# Patient Record
Sex: MALE | Race: WHITE | Employment: UNEMPLOYED | URBAN - METROPOLITAN AREA
[De-identification: names, ages, dates, MRNs, and addresses within clinical notes are randomized per-mention and may not be internally consistent; named-entity substitution may affect disease eponyms.]

---

## 2017-01-02 ENCOUNTER — MYC MEDICAL ADVICE (OUTPATIENT)
Dept: PEDIATRICS | Facility: CLINIC | Age: 52
End: 2017-01-02

## 2017-01-02 DIAGNOSIS — G58.8 PUDENDAL NEURALGIA: Primary | ICD-10-CM

## 2017-01-03 RX ORDER — HYDROCODONE BITARTRATE AND ACETAMINOPHEN 10; 325 MG/1; MG/1
.5-1 TABLET ORAL EVERY 8 HOURS PRN
Qty: 60 TABLET | Refills: 0 | Status: SHIPPED | OUTPATIENT
Start: 2017-01-03 | End: 2017-02-20

## 2017-01-03 NOTE — TELEPHONE ENCOUNTER
Norco       Last Written Prescription Date:  11/1/16  Last Fill Quantity: 60,   # refills: 0  Last Office Visit with [facility], [facility] or [facility] prescribing provider: 11/29/16  Future Office visit:       Routing refill request to provider for review/approval because:  Drug not on the [facility], [facility] or [facility] refill protocol or controlled substance    Zamzam Zuleta RN

## 2017-01-25 ENCOUNTER — MYC MEDICAL ADVICE (OUTPATIENT)
Dept: PEDIATRICS | Facility: CLINIC | Age: 52
End: 2017-01-25

## 2017-01-25 DIAGNOSIS — G58.8 PUDENDAL NEURALGIA: ICD-10-CM

## 2017-01-25 DIAGNOSIS — G89.4 CHRONIC PAIN SYNDROME: Primary | ICD-10-CM

## 2017-01-25 RX ORDER — HYDROMORPHONE HYDROCHLORIDE 2 MG/1
2 TABLET ORAL EVERY 4 HOURS PRN
Qty: 60 TABLET | Refills: 0 | Status: SHIPPED | OUTPATIENT
Start: 2017-01-25 | End: 2017-03-15

## 2017-01-25 NOTE — TELEPHONE ENCOUNTER
Dilaudid 2 mg      Last Written Prescription Date:  11/29/16  Last Fill Quantity: 60,   # refills: 0  Last Office Visit with [facility] or [facility] prescribing provider: 11/29/16  Future Office visit:       Routing refill request to provider for review/approval because:  Drug not on the [facility], [facility] or [facility] refill protocol or controlled substance    Marvin, RN  Triage Nurse

## 2017-01-25 NOTE — TELEPHONE ENCOUNTER
Walked RX to [facility]. Called and left detailed message informing pt of rx refilled and sent to our pharmacy and pharmacy will call when ready to be picked up and to call if he has any questions or concerns    Amber Johnson MA 1/25/2017 1:00 PM

## 2017-01-30 ENCOUNTER — CARE COORDINATION (OUTPATIENT)
Dept: CARE COORDINATION | Facility: CLINIC | Age: 52
End: 2017-01-30

## 2017-01-30 NOTE — Clinical Note
Health Care Home - Access Care Plan    About Me  Patient Name:  Taco Rooney    YOB: 1965  Age:                            51 year old   Yousif MRN:         [id number] Telephone Information:     Home Phone [phone]   Mobile [phone]       Address:    [address]  [place] [address] Email address:  [email or web address]      Emergency Contact(s)  Name Relationship Lgl Grd Work Phone Home Phone Mobile Phone   1. BHAVINK, ASSE Relative  [phone]   2. NO SECONDARY C*                  Health Maintenance: Routine Health maintenance Reviewed: Not assessed    My Access Plan  Medical Emergency 911   Questions or concerns during clinic hours Primary Clinic Line, I will call the clinic directly: Primary Clinic[facility] [place] [phone]   24 Hour Appointment Line [phone] or  [phone])  (toll free)   24 Hour Nurse Line [phone] (toll free)   Questions or concerns outside clinic hours 24 Hour Appointment Line, I will call the after-hours on-call line:   [facility] [phone] or [phone]) (toll-free)   Preferred Urgent Care Preferred Urgent Care: [facility] [place], [phone]   Preferred Hospital Preferred Hospital: [facility]  [phone]   Preferred Pharmacy [facility] [place]  [place]  [address]      Behavioral Health Crisis Line [facility], [phone] or 911     My Care Team Members  Patient Care Team       Relationship Specialty Notifications Start End    Eriberto Strange MD PCP - General   6/27/03     Phone: [phone] Fax: [phone]         [address] [place] [address]    Masters, Jeanne BLEDSOE, RN Clinic Care Coordinator   1/30/17     Comment:  Ph:  [phone]        My Medical and Care Information  Problem List   Patient Active Problem List   Diagnosis     Pudendal neuralgia      Generalized anxiety disorder     Hyperlipidemia with target LDL less than 130     Palpitations     Hypertriglyceridemia     HDL lipoprotein deficiency     Vitamin D deficiency     Chronic pain syndrome      Current Medications and Allergies:  See printed Medication Report

## 2017-01-30 NOTE — PROGRESS NOTES
"Clinic Care Coordination Contact  OUTREACH    Referral Information:  Referral Source: Self-patient/Caregiver  Reason for Contact:    Patient outreached to CCRN today.    He referenced a NoWait message that he sent to clinic on 08/12/2016.    See encounter for details.   Patient is experiencing care-giver stress for his grandmother, who is 99 y/o and a patient of [facility].   Care Conference: No     Universal Utilization:   ED Visits in last year: 0  Hospital visits in last year: 0  Last PCP appointment: 11/29/16  Missed Appointments:  (N/A)  Concerns: Caregiver stress.   Multiple Providers or Specialists:  (N/A)    Clinical Concerns:  Current Medical Concerns:  N/A  Current Behavioral Concerns:   Patient reports that he is suffering from caregiver stress of his 99 y/o grandmother ([facility] Patient).    He states that he has had \"better days\" than today.   He lives in [place], [place] and grandmother resides in [place], [place].   He reports that she has \"rough days.\"  She is \"feisty and stubborn.   Things are really stressful and dicey right now.   I don't have much help from my other relatives, in fact it is the complete opposite of that.\"       He states that his uncle lives in [place], [place] and is not helpful at all, despite what he thinks is going on with the patient's grandmother.      Education Provided to patient: CCRN advised that I could provide resources for him today, as he is primary caregiver.   I advised that he would have to make contact with her PCP office through [facility] for further assistance regarding her care - directly.     Clinical Pathway Name: None    Medication Management:  No concerns identified today.     Functional Status:  Mobility Status:  (Not assessed. )  Equipment Currently Used at Home:  (N/A)           Psychosocial:  Current living arrangement:: Not Assessed  Financial/Insurance: [employer] Freedom Plan,  Medicare- Part B Supplement        Resources and Interventions:  Current " Resources:  (N/A);  (N/A)  PAS Number:  (N/A)  Senior Linkage Line Referral Placed:  (Information given today for caregivers. )  Advanced Care Plans/Directives on file:: No  Referrals Placed: Senior Linkage Line - phone number and website provided to patient today.     Frequency of Care Coordination: Q 2-4 weeks  Upcoming appointment:  (None)        Plan:  [facility] Care Plan mailed to home address on file.    CCRN will outreach to patient in 2-4 weeks and will remain available to patient in that timeframe should he need anything.       Patient/Caregiver understanding: Patient agreed with plan.     Jeanne Skaggs, KJ  [facility]  Clinic Care Coordinator - [place] and [place] Locations   Direct:  [phone] (voicemail available)

## 2017-02-19 ENCOUNTER — MYC MEDICAL ADVICE (OUTPATIENT)
Dept: PEDIATRICS | Facility: CLINIC | Age: 52
End: 2017-02-19

## 2017-02-19 DIAGNOSIS — G89.4 CHRONIC PAIN SYNDROME: ICD-10-CM

## 2017-02-19 DIAGNOSIS — G58.8 PUDENDAL NEURALGIA: ICD-10-CM

## 2017-02-20 RX ORDER — HYDROCODONE BITARTRATE AND ACETAMINOPHEN 10; 325 MG/1; MG/1
.5-1 TABLET ORAL EVERY 8 HOURS PRN
Qty: 60 TABLET | Refills: 0 | Status: SHIPPED | OUTPATIENT
Start: 2017-02-20 | End: 2017-04-05

## 2017-02-20 NOTE — TELEPHONE ENCOUNTER
Hydrocodone      Last Written Prescription Date:  1/3/17  Last Fill Quantity: 60,   # refills: 0  Last Office Visit with [facility] prescribing provider: 11/29/16  Future Office visit:       Signed controlled substance agreement on 11/29/16    Routing refill request to provider for review/approval because:  Drug not on the [facility] or [facility] refill protocol or controlled substance.      No [place]  access at this time    Ivana Bledsoe, RN  Triage Nurse

## 2017-03-13 ENCOUNTER — CARE COORDINATION (OUTPATIENT)
Dept: CARE COORDINATION | Facility: CLINIC | Age: 52
End: 2017-03-13

## 2017-03-13 NOTE — LETTER
[facility] CARE COORDINATION  [address] [facility] [address]  Phone: [phone]      March 20, 2017      Taco Rooney  [address]    [facility] [address]    Dear Taco,  I am the Clinic Care Coordinator that works with your primary care provider's clinic. I wanted to introduce myself and provide you with my contact information for you to be able to call me with any questions or concerns. Below is a description of what Clinic Care Coordination is and how I can further assist you.     The Clinic Care Coordinator role is a Registered Nurse and/or  who understands the health care system. The goal of Clinic Care Coordination is to help you manage your health and improve access to the [facility] system in the most efficient manner.  The Registered Nurse can assist you in meeting your health care goals by providing education, coordinating services, and strengthening the communication among your providers. The  can assist you with financial, behavioral, psychosocial, and chemical dependency and counseling/psychiatric resources.    Please feel free to keep this letter and contact information to contact me at [phone] with any further questions or concerns that may arise. We at [facility] are focused on providing you with the highest-quality healthcare experience possible and that all starts with you.       Sincerely,     Jeanne Pastranas    Enclosed: I have enclosed a copy of a 24 Hour Access Plan. This has helpful phone numbers for you to call when needed. Please keep this in an easy to access place to use as needed.      24 Hour Access Plan    Presenting Problem Signs and Symptoms Treatment Plan    Questions or concerns during clinic hours    I will call the clinic directly     Questions or concerns outside clinic hours    I will call the 24 hour nurse line at [phone]    Patient needs to schedule an appointment    I will call the 24 hour scheduling team at  [phone] or clinic directly    Same day treatment     I will call the clinic first, nurse line if after hours, urgent care and express care if needed                          Clinic Care Coordinator: Jeanne Skaggs, RN Care Coordinator [phone]

## 2017-03-13 NOTE — PROGRESS NOTES
Clinic Care Coordination Contact  [facility]/Voicemail    Referral Source: Self-patient/Caregiver  Clinical Data: Care Coordinator Outreach  Outreach attempted x 1.  Left message on voicemail with call back information and requested return call.  Plan:  Care Coordinator will try to reach patient again in 3-5 business days.    Jeanne Skaggs, RN  [facility] Care Coordinator - [place] and [place] Locations   Direct:  [phone] (voicemail available)

## 2017-03-14 ENCOUNTER — MYC MEDICAL ADVICE (OUTPATIENT)
Dept: PEDIATRICS | Facility: CLINIC | Age: 52
End: 2017-03-14

## 2017-03-14 DIAGNOSIS — G89.4 CHRONIC PAIN SYNDROME: ICD-10-CM

## 2017-03-14 DIAGNOSIS — G58.8 PUDENDAL NEURALGIA: ICD-10-CM

## 2017-03-14 DIAGNOSIS — F51.01 PRIMARY INSOMNIA: ICD-10-CM

## 2017-03-15 RX ORDER — HYDROMORPHONE HYDROCHLORIDE 2 MG/1
2 TABLET ORAL EVERY 4 HOURS PRN
Qty: 60 TABLET | Refills: 0 | Status: SHIPPED | OUTPATIENT
Start: 2017-03-15 | End: 2017-04-25

## 2017-03-15 NOTE — TELEPHONE ENCOUNTER
Pt should've enough ambien to last till 04/14. One month early refill. Checked with [facility] pharmacy, last refill dates of Ambien(30# each) were 10/14, 11/11, 11/30, 12/29, 01/26 & 02/22.     Ambien 10 mg       Last Written Prescription Date:  10/14/16  Last Fill Quantity: 30,   # refills: 5  Dilaudid 2 mg      Last Written Prescription Date:  01/25/17  Last Fill Quantity: 60,   # refills: 0  Last Office Visit with [facility] or  [facility] prescribing provider: 11/29/16  Future Office visit:       Routing refill request to provider for review/approval because:  Drug not on the [facility] or [facility] refill protocol or controlled substance    Marvin, RN  Triage Nurse

## 2017-03-17 RX ORDER — ZOLPIDEM TARTRATE 10 MG/1
10 TABLET ORAL
Qty: 30 TABLET | Refills: 5 | Status: SHIPPED | OUTPATIENT
Start: 2017-03-17 | End: 2017-08-28

## 2017-03-20 NOTE — PROGRESS NOTES
Clinic Care Coordination Contact  [facility]/Voicemail    Referral Source: Self-patient/Caregiver  Clinical Data: Care Coordinator Outreach  Outreach attempted x 2.    Plan: Care Coordinator will mail out care coordination introduction letter with care coordinator contact information and explanation of care coordination services. Care Coordinator will do no further outreaches at this time.  Placing on INACTIVE status.    CCRN is not actively following this patient.    Jeanne Skaggs, RN  [facility] Care Coordinator - [place] and [place] Locations   Direct:  [phone] (voicemail available)

## 2017-04-05 ENCOUNTER — MYC MEDICAL ADVICE (OUTPATIENT)
Dept: PEDIATRICS | Facility: CLINIC | Age: 52
End: 2017-04-05

## 2017-04-05 DIAGNOSIS — G58.8 PUDENDAL NEURALGIA: ICD-10-CM

## 2017-04-05 RX ORDER — HYDROCODONE BITARTRATE AND ACETAMINOPHEN 10; 325 MG/1; MG/1
.5-1 TABLET ORAL EVERY 8 HOURS PRN
Qty: 60 TABLET | Refills: 0 | Status: SHIPPED | OUTPATIENT
Start: 2017-04-05 | End: 2017-05-11

## 2017-04-05 NOTE — TELEPHONE ENCOUNTER
Please drop off rx with [facility] pharmacy when ready. Thanks.     Controlled Substance Refill Request for Norco(02/20/17 60#)  Problem List Complete:  Yes  Medication(s): Hydromorphone 2 mg, Vicodin 5/325.   Maximum quantity per month: 60, 30  Clinic visit frequency required: Q 3 months    checked in past 6 months?  Yes (04/05/17) refilled 60# on 02/20/17, 01/03/17 & 11/01/16     Marvin, RN  Triage Nurse

## 2017-04-24 ENCOUNTER — MYC MEDICAL ADVICE (OUTPATIENT)
Dept: PEDIATRICS | Facility: CLINIC | Age: 52
End: 2017-04-24

## 2017-04-24 DIAGNOSIS — G89.4 CHRONIC PAIN SYNDROME: ICD-10-CM

## 2017-04-24 DIAGNOSIS — G58.8 PUDENDAL NEURALGIA: ICD-10-CM

## 2017-04-25 RX ORDER — HYDROMORPHONE HYDROCHLORIDE 2 MG/1
2 TABLET ORAL EVERY 4 HOURS PRN
Qty: 60 TABLET | Refills: 0 | Status: SHIPPED | OUTPATIENT
Start: 2017-04-25 | End: 2017-05-12 | Stop reason: SINTOL

## 2017-04-25 NOTE — TELEPHONE ENCOUNTER
Dilaudid 2 MG      Last Written Prescription Date:  3/15/17  Last Fill Quantity: 60,   # refills: 0  Last Office Visit with [facility] or  [facility] prescribing provider: 11/29/16  Future Office visit:      checked LF 3/15/17  Routing refill request to provider for review/approval because:  Drug not on the [facility], P or  Health refill protocol or controlled substance    Zamzam Zuleta RN

## 2017-05-11 ENCOUNTER — MYC MEDICAL ADVICE (OUTPATIENT)
Dept: PEDIATRICS | Facility: CLINIC | Age: 52
End: 2017-05-11

## 2017-05-11 DIAGNOSIS — G58.8 PUDENDAL NEURALGIA: ICD-10-CM

## 2017-05-11 DIAGNOSIS — E78.5 HYPERLIPIDEMIA WITH TARGET LDL LESS THAN 130: ICD-10-CM

## 2017-05-11 NOTE — TELEPHONE ENCOUNTER
simvastatin       Last Written Prescription Date: 2-4-16  Last Fill Quantity: 90, # refills: 3    Last [facility] Visit with [facility] or [facility] prescribing provider:  11-29-16   Future Office Visit:      Cholesterol   Date Value Ref Range Status   01/14/2016 139 <200 mg/dL Final     HDL Cholesterol   Date Value Ref Range Status   01/14/2016 39 (L) >39 mg/dL Final     LDL Cholesterol Calculated   Date Value Ref Range Status   01/14/2016 69 <100 mg/dL Final     Comment:     Desirable:       <100 mg/dl     Triglycerides   Date Value Ref Range Status   01/14/2016 157 (H) <150 mg/dL Final     Comment:     Borderline high:  150-199 mg/dl   High:             200-499 mg/dl   Very high:       >499 mg/dl       Cholesterol/HDL Ratio   Date Value Ref Range Status   10/29/2014 3.1 0.0 - 5.0 Final     ALT   Date Value Ref Range Status   01/14/2016 65 0 - 70 U/L Final   norco      Last Written Prescription Date:  4-5-17  Last Fill Quantity: 60,   # refills: 0  Last Office Visit with [facility] or [facility] prescribing provider: 11-29-16  Future Office visit:       Routing refill request to provider for review/approval because:  Drug not on the [facility], [facility] or [facility] refill protocol or controlled substance  Medication(s): Hydromorphone 2 mg, Vicodin 5/325.   Maximum quantity per month: 60, 30  Clinic visit frequency required: Q 3 months   Controlled substance agreement:      Encounter-Level CSA:      There are no encounter-level csa.       Pain Clinic evaluation in the past: Yes  Date/Location: several different clinics  Last [facility] website verification: done on 7/29/16

## 2017-05-12 RX ORDER — SIMVASTATIN 20 MG
20 TABLET ORAL AT BEDTIME
Qty: 90 TABLET | Refills: 0 | Status: SHIPPED | OUTPATIENT
Start: 2017-05-12 | End: 2017-08-28

## 2017-05-12 RX ORDER — HYDROCODONE BITARTRATE AND ACETAMINOPHEN 10; 325 MG/1; MG/1
.5-1 TABLET ORAL EVERY 8 HOURS PRN
Qty: 60 TABLET | Refills: 0 | Status: SHIPPED | OUTPATIENT
Start: 2017-05-12 | End: 2017-06-28

## 2017-05-30 ENCOUNTER — HOSPITAL ENCOUNTER (EMERGENCY)
Facility: CLINIC | Age: 52
Discharge: HOME | End: 2017-05-31
Attending: EMERGENCY MEDICINE | Admitting: EMERGENCY MEDICINE
Payer: MEDICARE

## 2017-05-30 ENCOUNTER — TELEPHONE (OUTPATIENT)
Dept: NURSING | Facility: CLINIC | Age: 52
End: 2017-05-30

## 2017-05-30 ENCOUNTER — APPOINTMENT (OUTPATIENT)
Dept: CT IMAGING | Facility: CLINIC | Age: 52
End: 2017-05-30
Attending: EMERGENCY MEDICINE
Payer: MEDICARE

## 2017-05-30 DIAGNOSIS — M54.9 ACUTE BILATERAL BACK PAIN, UNSPECIFIED BACK LOCATION: ICD-10-CM

## 2017-05-30 DIAGNOSIS — K80.20 CALCULUS OF GALLBLADDER WITHOUT CHOLECYSTITIS WITHOUT OBSTRUCTION: ICD-10-CM

## 2017-05-30 LAB
ALBUMIN SERPL-MCNC: 4.5 G/DL (ref 3.4–5)
ALP SERPL-CCNC: 96 U/L (ref 40–150)
ALT SERPL W P-5'-P-CCNC: 48 U/L (ref 0–70)
ANION GAP SERPL CALCULATED.3IONS-SCNC: 9 MMOL/L (ref 3–14)
AST SERPL W P-5'-P-CCNC: 43 U/L (ref 0–45)
BASOPHILS # BLD AUTO: 0 10E9/L (ref 0–0.2)
BASOPHILS NFR BLD AUTO: 0.2 %
BILIRUB SERPL-MCNC: 1.4 MG/DL (ref 0.2–1.3)
BUN SERPL-MCNC: 12 MG/DL (ref 7–30)
CALCIUM SERPL-MCNC: 9.5 MG/DL (ref 8.5–10.1)
CHLORIDE SERPL-SCNC: 103 MMOL/L (ref 94–109)
CO2 SERPL-SCNC: 26 MMOL/L (ref 20–32)
CREAT BLD-MCNC: 1.2 MG/DL (ref 0.66–1.25)
CREAT SERPL-MCNC: 1.05 MG/DL (ref 0.66–1.25)
DIFFERENTIAL METHOD BLD: ABNORMAL
EOSINOPHIL # BLD AUTO: 0 10E9/L (ref 0–0.7)
EOSINOPHIL NFR BLD AUTO: 0.5 %
ERYTHROCYTE [DISTWIDTH] IN BLOOD BY AUTOMATED COUNT: 12.6 % (ref 10–15)
GFR SERPL CREATININE-BSD FRML MDRD: 63 ML/MIN/1.7M2
GFR SERPL CREATININE-BSD FRML MDRD: 74 ML/MIN/1.7M2
GLUCOSE SERPL-MCNC: 111 MG/DL (ref 70–99)
HCT VFR BLD AUTO: 42.4 % (ref 40–53)
HGB BLD-MCNC: 15.4 G/DL (ref 13.3–17.7)
IMM GRANULOCYTES # BLD: 0 10E9/L (ref 0–0.4)
IMM GRANULOCYTES NFR BLD: 0.2 %
LIPASE SERPL-CCNC: 165 U/L (ref 73–393)
LYMPHOCYTES # BLD AUTO: 0.8 10E9/L (ref 0.8–5.3)
LYMPHOCYTES NFR BLD AUTO: 14.8 %
MCH RBC QN AUTO: 32.6 PG (ref 26.5–33)
MCHC RBC AUTO-ENTMCNC: 36.3 G/DL (ref 31.5–36.5)
MCV RBC AUTO: 90 FL (ref 78–100)
MONOCYTES # BLD AUTO: 0.4 10E9/L (ref 0–1.3)
MONOCYTES NFR BLD AUTO: 7.8 %
NEUTROPHILS # BLD AUTO: 4.2 10E9/L (ref 1.6–8.3)
NEUTROPHILS NFR BLD AUTO: 76.5 %
NRBC # BLD AUTO: 0 10*3/UL
NRBC BLD AUTO-RTO: 0 /100
PLATELET # BLD AUTO: 109 10E9/L (ref 150–450)
POTASSIUM SERPL-SCNC: 3.7 MMOL/L (ref 3.4–5.3)
PROT SERPL-MCNC: 7.3 G/DL (ref 6.8–8.8)
RBC # BLD AUTO: 4.72 10E12/L (ref 4.4–5.9)
SODIUM SERPL-SCNC: 138 MMOL/L (ref 133–144)
WBC # BLD AUTO: 5.5 10E9/L (ref 4–11)

## 2017-05-30 PROCEDURE — 83690 ASSAY OF LIPASE: CPT | Performed by: EMERGENCY MEDICINE

## 2017-05-30 PROCEDURE — 74177 CT ABD & PELVIS W/CONTRAST: CPT

## 2017-05-30 PROCEDURE — 25000128 H RX IP 250 OP 636: Performed by: EMERGENCY MEDICINE

## 2017-05-30 PROCEDURE — 85025 COMPLETE CBC W/AUTO DIFF WBC: CPT | Performed by: EMERGENCY MEDICINE

## 2017-05-30 PROCEDURE — 99285 EMERGENCY DEPT VISIT HI MDM: CPT | Mod: 25

## 2017-05-30 PROCEDURE — 71260 CT THORAX DX C+: CPT

## 2017-05-30 PROCEDURE — 82565 ASSAY OF CREATININE: CPT | Mod: 91

## 2017-05-30 PROCEDURE — 80053 COMPREHEN METABOLIC PANEL: CPT | Performed by: EMERGENCY MEDICINE

## 2017-05-30 RX ORDER — IOPAMIDOL 755 MG/ML
500 INJECTION, SOLUTION INTRAVASCULAR ONCE
Status: COMPLETED | OUTPATIENT
Start: 2017-05-30 | End: 2017-05-30

## 2017-05-30 RX ADMIN — SODIUM CHLORIDE 59 ML: 9 INJECTION, SOLUTION INTRAVENOUS at 23:16

## 2017-05-30 RX ADMIN — IOPAMIDOL 75 ML: 755 INJECTION, SOLUTION INTRAVENOUS at 23:16

## 2017-05-30 ASSESSMENT — ENCOUNTER SYMPTOMS
SHORTNESS OF BREATH: 1
BACK PAIN: 1
HEADACHES: 0
ABDOMINAL PAIN: 0

## 2017-05-30 NOTE — ED AVS SNAPSHOT
[facility] Emergency Department    [address]    [facility] [address]    Phone:  [phone]    Fax:  [phone]                                       Taco Rooney   MRN: [id number]    Department:  [facility] Emergency Department   Date of Visit:  5/30/2017           Patient Information     Date Of Birth          1965        Your diagnoses for this visit were:     Acute bilateral back pain, unspecified back location     Calculus of gallbladder without cholecystitis without obstruction        You were seen by Alfredo Davila MD.        Discharge Instructions       Please make an appointment to follow up with your primary care provider in 4-5 days if not improving.    Return to ER immediately if you develop: worsening pain, new numbness or weakness in legs, loss of bowel or bladder control, inability to urinate, Fever > 101, persistent nausea or vomiting OR you have any other concerns about your health.        Discharge Instructions  Back Pain  You were seen today for back pain. Back pain can have many causes, but most will get better without surgery or other specific treatment. Sometimes there is a herniated ( slipped ) disc. We don t usually do MRI scans to look for these right away, since most herniated discs will get better on their own with time.  Today, we did not find any evidence that your back pain was caused by a serious condition, such as an infection, fracture, or tumor. However, sometimes symptoms develop over time and cannot be found during an emergency visit, so it is very important that you follow up with your primary doctor.  Return to the Emergency Department if:    You develop a fever with your back pain.     You have weakness or change in sensation in one or both legs.    You lose control of your bowels or bladder, or can t empty your bladder.    Your pain gets much worse.     Follow-up with your doctor:    Unless your pain has completely  gone away, please make an appointment with your doctor within one week.  You may need further management of your back pain, such as more pain medication, imaging such as an X-ray or MRI, or physical therapy.    What can I do to help myself?    Remain Active -- People are often afraid that they will hurt their back further or delay recovery by remaining active, but this is one of the best things you can do for your back. In fact, prolonged bed rest is not recommended. Studies have shown that people with low back pain recover faster when they remain active. Movement helps to bring blood flow to the muscles and relieve muscle spasms as well as preventing loss of muscle strength.    Heat -- Using a heating pad can help with low back pain during the first few weeks. Do not sleep with a heating pad, as you can be burned.     Pain medications - You may take a pain medication such as Tylenol  (acetaminophen), Advil , Nuprin  (ibuprofen) or Aleve  (naproxen).  If you have been given a narcotic such as Vicodin  (hydrocodone with acetaminophen), Percocet  (oxycodone with acetaminophen), codeine, or a muscle relaxant such as Flexeril  (cyclobenzaprine) or Soma  (carisoprodol), do not drive for four hours after you have taken it. If the narcotic contains Tylenol  (acetaminophen), do not take Tylenol  with it. All narcotics will cause constipation, so eat a high fiber diet.   If you were given a prescription for medicine here today, be sure to read all of the information (including the package insert) that comes with your prescription.  This will include important information about the medicine, its side effects, and any warnings that you need to know about.  The pharmacist who fills the prescription can provide more information and answer questions you may have about the medicine.  If you have questions or concerns that the pharmacist cannot address, please call or return to the Emergency Department.   Opioid Medication  Information    Pain medications are among the most commonly prescribed medicines, so we are including this information for all our patients. If you did not receive pain medication or get a prescription for pain medicine, you can ignore it.     You may have been given a prescription for an opioid (narcotic) pain medicine and/or have received a pain medicine while here in the Emergency Department. These medicines can make you drowsy or impaired. You must not drive, operate dangerous equipment, or engage in any other dangerous activities while taking these medications. If you drive while taking these medications, you could be arrested for DUI, or driving under the influence. Do not drink any alcohol while you are taking these medications.     Opioid pain medications can cause addiction. If you have a history of chemical dependency of any type, you are at a higher risk of becoming addicted to pain medications.  Only take these prescribed medications to treat your pain when all other options have been tried. Take it for as short a time and as few doses as possible. Store your pain pills in a secure place, as they are frequently stolen and provide a dangerous opportunity for children or visitors in your house to start abusing these powerful medications. We will not replace any lost or stolen medicine.  As soon as your pain is better, you should flush all your remaining medication.     Many prescription pain medications contain Tylenol  (acetaminophen), including Vicodin , Tylenol #3 , Norco , Lortab , and Percocet .  You should not take any extra pills of Tylenol  if you are using these prescription medications or you can get very sick.  Do not ever take more than 3000 mg of acetaminophen in any 24 hour period.    All opioids tend to cause constipation. Drink plenty of water and eat foods that have a lot of fiber, such as fruits, vegetables, prune juice, apple juice and high fiber cereal.  Take a laxative if you don t  move your bowels at least every other day. Miralax , Milk of Magnesia, Colace , or Senna  can be used to keep you regular.      Remember that you can always come back to the Emergency Department if you are not able to see your regular doctor in the amount of time listed above, if you get any new symptoms, or if there is anything that worries you.            What are Gallstones?  The gallbladder stores bile, a fluid made by the liver. Bile helps digest fats in the foods you eat. Gallstones form when certain substances in the bile crystallize and become solid. In some cases, the stones don t cause any symptoms. In others, they irritate the walls of the gallbladder. More serious problems can occur if stones move into nearby ducts--such as the common bile duct--and cause blockages. This can block the flow of bile and can lead to pain, nausea, and infection.    Common symptoms  Gallbladder problems can cause painful attacks, often after a meal. Some people have only one attack. Others have many. Common symptoms include:    Severe, steady pain or aching in the upper right abdomen, back, or right shoulder blade, lasting from 30 minutes to several hours    A dull ache beneath the ribs or breastbone    Nausea, upset stomach, or vomiting    Jaundice (a buildup of bile chemicals in the blood), which causes yellowing of the skin and eyes, dark urine, and itching. Call your doctor immediately if this system develops.  Treating gallstones  If your stones are not causing symptoms, you may choose to delay treatment. But if you ve had one or more painful attacks, your doctor will likely recommend removing your gallbladder. This prevents more stones from forming and causing attacks. It also helps prevent complications, such as stones passing into the ducts and causing infection or pancreatitis. After the gallbladder is removed, your liver will still make bile to aid digestion.     If you re pregnant  Hormone changes during pregnancy  can make bile more likely to form stones. If your gallbladder needs to be removed, your doctor will talk with you about the timing for surgery. In some cases, it can be delayed until after childbirth. In others, you may have surgery during pregnancy. This helps protect you and your baby s health.        4803-7604 [employer]. [address] [place] [address]. All rights reserved. This information is not intended as a substitute for professional medical care. Always follow your healthcare professional's instructions.            24 Hour Appointment Hotline       To make an appointment at any [facility], call [phone] ([phone]). If you don't have a family doctor or clinic, we will help you find one. [facility] are conveniently located to serve the needs of you and your family.             Review of your medicines      START taking        Dose / Directions Last dose taken    cyclobenzaprine 10 MG tablet   Commonly known as:  FLEXERIL   Dose:  10 mg   Quantity:  30 tablet        Take 1 tablet (10 mg) by mouth 3 times daily as needed for muscle spasms   Refills:  0        oxyCODONE 5 MG IR tablet   Commonly known as:  ROXICODONE   Dose:  5-10 mg   Quantity:  12 tablet        Take 1-2 tablets (5-10 mg) by mouth every 6 hours as needed for moderate to severe pain   Refills:  0          Our records show that you are taking the medicines listed below. If these are incorrect, please call your family doctor or clinic.        Dose / Directions Last dose taken    aspirin 81 MG tablet   Dose:  81 mg   Quantity:  100 tablet        Take 1 tablet (81 mg) by mouth daily   Refills:  3        diazepam 10 MG Gel rectal kit   Commonly known as:  DIASTAT ACUDIAL   Dose:  10 mg   Quantity:  60 each        Place 10 mg rectally 2 times daily as needed   Refills:  5        fish oil-omega-3 fatty acids 1000 MG capsule        1 twice per day   Refills:  0        HYDROcodone-acetaminophen  MG  per tablet   Commonly known as:  NORCO   Dose:  0.5-1 tablet   Quantity:  60 tablet        Take 0.5-1 tablets by mouth every 8 hours as needed for pain   Refills:  0        simvastatin 20 MG tablet   Commonly known as:  ZOCOR   Dose:  20 mg   Quantity:  90 tablet        Take 1 tablet (20 mg) by mouth At Bedtime   Refills:  0        zolpidem 10 MG tablet   Commonly known as:  AMBIEN   Dose:  10 mg   Quantity:  30 tablet        Take 1 tablet (10 mg) by mouth nightly as needed for sleep   Refills:  5                Prescriptions were sent or printed at these locations (2 Prescriptions)                   Other Prescriptions                Printed at Department/Unit printer (2 of 2)         oxyCODONE (ROXICODONE) 5 MG IR tablet               cyclobenzaprine (FLEXERIL) 10 MG tablet                Procedures and tests performed during your visit     CBC with platelets differential    CT Chest/Abdomen/Pelvis w Contrast    Comprehensive metabolic panel    Creatinine POCT    ISTAT creatinine nursing POCT    Lipase      Orders Needing Specimen Collection     None      Pending Results     Date and Time Order Name Status Description    5/30/2017 2221 CT Chest/Abdomen/Pelvis w Contrast Preliminary             Pending Culture Results     No orders found for last 3 day(s).            Pending Results Instructions     If you had any lab results that were not finalized at the time of your Discharge, you can call the ED Lab Result RN at [phone]. You will be contacted by this team for any positive Lab results or changes in treatment. The nurses are available 7 days a week from 10A to 6:30P.  You can leave a message 24 hours per day and they will return your call.        Test Results From Your Hospital Stay        5/30/2017 10:29 PM      Component Results     Component Value Ref Range & Units Status    WBC 5.5 4.0 - 11.0 10e9/L Final    RBC Count 4.72 4.4 - 5.9 10e12/L Final    Hemoglobin 15.4 13.3 - 17.7 g/dL Final    Hematocrit  42.4 40.0 - 53.0 % Final    MCV 90 78 - 100 fl Final    MCH 32.6 26.5 - 33.0 pg Final    MCHC 36.3 31.5 - 36.5 g/dL Final    RDW 12.6 10.0 - 15.0 % Final    Platelet Count 109 (L) 150 - 450 10e9/L Final    Diff Method Automated Method  Final    % Neutrophils 76.5 % Final    % Lymphocytes 14.8 % Final    % Monocytes 7.8 % Final    % Eosinophils 0.5 % Final    % Basophils 0.2 % Final    % Immature Granulocytes 0.2 % Final    Nucleated RBCs 0 0 /100 Final    Absolute Neutrophil 4.2 1.6 - 8.3 10e9/L Final    Absolute Lymphocytes 0.8 0.8 - 5.3 10e9/L Final    Absolute Monocytes 0.4 0.0 - 1.3 10e9/L Final    Absolute Eosinophils 0.0 0.0 - 0.7 10e9/L Final    Absolute Basophils 0.0 0.0 - 0.2 10e9/L Final    Abs Immature Granulocytes 0.0 0 - 0.4 10e9/L Final    Absolute Nucleated RBC 0.0  Final         5/30/2017 10:49 PM      Component Results     Component Value Ref Range & Units Status    Sodium 138 133 - 144 mmol/L Final    Potassium 3.7 3.4 - 5.3 mmol/L Final    Chloride 103 94 - 109 mmol/L Final    Carbon Dioxide 26 20 - 32 mmol/L Final    Anion Gap 9 3 - 14 mmol/L Final    Glucose 111 (H) 70 - 99 mg/dL Final    Urea Nitrogen 12 7 - 30 mg/dL Final    Creatinine 1.05 0.66 - 1.25 mg/dL Final    GFR Estimate 74 >60 mL/min/1.7m2 Final    Non  GFR Calc    GFR Estimate If Black 89 >60 mL/min/1.7m2 Final    African American GFR Calc    Calcium 9.5 8.5 - 10.1 mg/dL Final    Bilirubin Total 1.4 (H) 0.2 - 1.3 mg/dL Final    Albumin 4.5 3.4 - 5.0 g/dL Final    Protein Total 7.3 6.8 - 8.8 g/dL Final    Alkaline Phosphatase 96 40 - 150 U/L Final    ALT 48 0 - 70 U/L Final    AST 43 0 - 45 U/L Final         5/30/2017 10:49 PM      Component Results     Component Value Ref Range & Units Status    Lipase 165 73 - 393 U/L Final         5/30/2017 11:35 PM      Narrative     CT CHEST/ABDOMEN/PELVIS W CONTRAST  5/30/2017 11:25 PM      HISTORY: Acute onset back pain radiating to low back, rule out  dissection.    TECHNIQUE:  CT chest, abdomen and pelvis with intravenous contrast.  Radiation dose for this scan was reduced using automated exposure  control, adjustment of the mA and/or kV according to patient size, or  iterative reconstruction technique. 75 mL Isovue-370.      COMPARISON: 11/22/2009.    FINDINGS:  Chest: There is dependent atelectasis bilaterally. The lungs are  otherwise clear. No pneumothorax or pleural effusion. The heart size  is normal. No thoracic aortic aneurysm or dissection. No mediastinal,  hilar or axillary lymph node enlargement.    Abdomen: The liver and spleen are normal in appearance. There are  calcified stones in the gallbladder. The pancreas, adrenal glands and  left kidney are normal in appearance. A few areas of cortical scarring  in the right kidney. There is no abdominal or pelvic lymph node  enlargement. No aortic aneurysm or dissection.    Pelvis: There is no bowel obstruction or inflammation. No free  intraperitoneal gas or fluid. Mild degenerative disease in the spine.  No evidence of fracture.        Impression     IMPRESSION:  1. No acute abnormality. No aortic aneurysm or dissection.  2. Cholelithiasis.         5/30/2017 10:31 PM      Component Results     Component Value Ref Range & Units Status    Creatinine 1.2 0.66 - 1.25 mg/dL Final    GFR Estimate 63 >60 mL/min/1.7m2 Final    GFR Estimate If Black 76 >60 mL/min/1.7m2 Final                Clinical Quality Measure: Blood Pressure Screening     Your blood pressure was checked while you were in the emergency department today. The last reading we obtained was  BP: (!) 146/101 . Please read the guidelines below about what these numbers mean and what you should do about them.  If your systolic blood pressure (the top number) is less than 120 and your diastolic blood pressure (the bottom number) is less than 80, then your blood pressure is normal. There is nothing more that you need to do about it.  If your systolic blood pressure (the top  number) is 120-139 or your diastolic blood pressure (the bottom number) is 80-89, your blood pressure may be higher than it should be. You should have your blood pressure rechecked within a year by a primary care provider.  If your systolic blood pressure (the top number) is 140 or greater or your diastolic blood pressure (the bottom number) is 90 or greater, you may have high blood pressure. High blood pressure is treatable, but if left untreated over time it can put you at risk for heart attack, stroke, or kidney failure. You should have your blood pressure rechecked by a primary care provider within the next 4 weeks.  If your provider in the emergency department today gave you specific instructions to follow-up with your doctor or provider even sooner than that, you should follow that instruction and not wait for up to 4 weeks for your follow-up visit.        Thank you for choosing [facility]       Thank you for choosing [facility] for your care. Our goal is always to provide you with excellent care. Hearing back from our patients is one way we can continue to improve our services. Please take a few minutes to complete the written survey that you may receive in the mail after you visit with us. Thank you!        [employer] Information     [email or web address] gives you secure access to your electronic health record. If you see a primary care provider, you can also send messages to your care team and make appointments. If you have questions, please call your primary care clinic.  If you do not have a primary care provider, please call [phone] and they will assist you.        Care EveryWhere ID     This is your Care EveryWhere ID. This could be used by other organizations to access your [facility] medical records  [id number]        After Visit Summary       This is your record. Keep this with you and show to your community pharmacist(s) and doctor(s) at your next visit.

## 2017-05-30 NOTE — ED AVS SNAPSHOT
[facility] Emergency Department    [address]    [facility] [address]    Phone:  [phone]    Fax:  [phone]                                       Taco Rooney   MRN: [id number]    Department:  [facility] Emergency Department   Date of Visit:  5/30/2017           After Visit Summary Signature Page     I have received my discharge instructions, and my questions have been answered. I have discussed any challenges I see with this plan with the nurse or doctor.    ..........................................................................................................................................  Patient/Patient Representative Signature      ..........................................................................................................................................  Patient Representative Print Name and Relationship to Patient    ..................................................               ................................................  Date                                            Time    ..........................................................................................................................................  Reviewed by Signature/Title    ...................................................              ..............................................  Date                                                            Time

## 2017-05-31 VITALS
BODY MASS INDEX: 24.4 KG/M2 | TEMPERATURE: 98.2 F | OXYGEN SATURATION: 97 % | SYSTOLIC BLOOD PRESSURE: 146 MMHG | WEIGHT: 150 LBS | HEART RATE: 64 BPM | DIASTOLIC BLOOD PRESSURE: 101 MMHG | RESPIRATION RATE: 18 BRPM

## 2017-05-31 PROCEDURE — 25000132 ZZH RX MED GY IP 250 OP 250 PS 637: Mod: GY | Performed by: EMERGENCY MEDICINE

## 2017-05-31 PROCEDURE — A9270 NON-COVERED ITEM OR SERVICE: HCPCS | Mod: GY | Performed by: EMERGENCY MEDICINE

## 2017-05-31 RX ORDER — OXYCODONE HYDROCHLORIDE 5 MG/1
5-10 TABLET ORAL EVERY 6 HOURS PRN
Qty: 12 TABLET | Refills: 0 | Status: SHIPPED | OUTPATIENT
Start: 2017-05-31 | End: 2017-06-05

## 2017-05-31 RX ORDER — OXYCODONE HYDROCHLORIDE 5 MG/1
5 TABLET ORAL ONCE
Status: COMPLETED | OUTPATIENT
Start: 2017-05-31 | End: 2017-05-31

## 2017-05-31 RX ORDER — CYCLOBENZAPRINE HCL 10 MG
10 TABLET ORAL 3 TIMES DAILY PRN
Qty: 30 TABLET | Refills: 0 | Status: SHIPPED | OUTPATIENT
Start: 2017-05-31 | End: 2017-06-05 | Stop reason: ALTCHOICE

## 2017-05-31 RX ADMIN — OXYCODONE HYDROCHLORIDE 5 MG: 5 TABLET ORAL at 00:47

## 2017-05-31 NOTE — ED PROVIDER NOTES
History     Chief Complaint:  Back pain     HPI   Taco Rooney is a 52 year old male with a history of pelvic neuropathy who presents with EMS for back pain. The patient states that he got back from a walk and took a shower this evening. After his shower, he felt a sudden onset of mid back pain described as a clamping and crunching pain which also caused him to have shortness of breath. The patient took 10mg of Vicodin at home. En route, EMS gave the patient 10mg of Morphine and 1 of Ativan. In the ED the patient is able to stand and complains of the mid back pain, but denies chest pain, headaches, or abdominal pain.     Allergies:  Sulfa drugs     Medications:    Norco   Zocor  Ambien  Diazepam  Aspirin    Past Medical History:    Pelvic floor dysfunction   Alcoholism  Cardiovascular disease unspecified  Chronic pain syndrome  Hypertriglyceridemia  HDL lipoprotein deficiency  Palpitations  Hyperlipidemia  Anxiety  Pudendal neuralgia     Past Surgical History:    Pudendal nerve decompression    Family History:    Depression - mother, father  CAD - Father  Diabetes - Mother  Alcohol/drug - father    Social History:  Smoking status: Never smoker  Alcohol use: No     Review of Systems   Respiratory: Positive for shortness of breath.    Cardiovascular: Negative for chest pain.   Gastrointestinal: Negative for abdominal pain.   Musculoskeletal: Positive for back pain.   Neurological: Negative for headaches.   All other systems reviewed and are negative.      Physical Exam   First Vitals:       Physical Exam   Constitutional: He appears distressed (uncomfortable appearing).   HENT:   Right Ear: External ear normal.   Left Ear: External ear normal.   Nose: Nose normal.   Eyes: Conjunctivae and lids are normal.   Neck: Neck supple. No tracheal deviation present.   Cardiovascular: Regular rhythm and intact distal pulses.    No murmur heard.  Pulmonary/Chest: Breath sounds normal. No respiratory distress. He has no  wheezes.   Abdominal: Soft. There is no tenderness. There is no rebound and no guarding.   Musculoskeletal:   No peripheral edema    + ttp upper L spine, midline and paraspinous, no step off or deformity    No crepitus    Pain reproducible with movement back and torso   Neurological:   MAEE, no gross focal motor or sensory deficit   Skin: Skin is warm and dry. He is not diaphoretic.   Psychiatric: He has a normal mood and affect.   Nursing note and vitals reviewed.      Emergency Department Course   Imaging:  Radiographic findings were communicated with the patient who voiced understanding of the findings.    CT chest/abdomen/pelvis w contrast  1. No acute abnormality. No aortic aneurysm or dissection.  2. Cholelithiasis.  As read by Radiology.    Laboratory:  2223 Creatinine POCT 1.2  CBC: (L) o/w WNL (WBC 5.5, HGB 15.4)    CMP: Glucose 111, bilirubin 1.4 o/w WNL (Creatinine 1.05)   Lipase: 165    Interventions:  0047 Roxicodone 5mg PO     Emergency Department Course:  The patient arrived in the emergency department via EMS.   Past medical records, nursing notes, and vitals reviewed.  2210: I performed an exam of the patient and obtained history, as documented above.   The patient was sent for a CT-scan while in the emergency department, findings above.   IV established, labs were drawn, and interventions given as above  0024: I rechecked the patient. Findings and plan explained to the Patient. Patient discharged home with instructions regarding supportive care, medications, and reasons to return. The importance of close follow-up was reviewed.      Impression & Plan    Medical Decision Making:  Taco Rooney is a 52 year old male here with acute onset of upper lumbar spine back pain with some reproducible on examination. The pain is not preceded by trauma. Benign with concern with muscular skeletal etiology, radiculopathy, as well as non-back related ideologies suggesting kidney stone, dissection,  obstruction, perforation, hepatobiliary disease, or pancreatitis. The workup was unremarkable. I put in the CT scan of abdomen, pelvis, chest, and blood workup. There are no emergent infections of surgical or vascular etiology of this pain as seen. He can be safely discharged home and treated symptomatically for muscular skeletal injury.    Diagnosis:    ICD-10-CM   1. Acute bilateral back pain, unspecified back location M54.9   2. Calculus of gallbladder without cholecystitis without obstruction K80.20       Disposition:  discharged to home    Discharge Medications:   Details   oxyCODONE (ROXICODONE) 5 MG IR tablet Take 1-2 tablets (5-10 mg) by mouth every 6 hours as needed for moderate to severe pain, Disp-12 tablet, R-0, Local Print      cyclobenzaprine (FLEXERIL) 10 MG tablet Take 1 tablet (10 mg) by mouth 3 times daily as needed for muscle spasms, Disp-30 tablet, R-0, Local Print          Garcia Benavidez  5/30/2017   [facility] EMERGENCY DEPARTMENT  I, Garcia Benavidez, am serving as a scribe at 10:10 PM on 5/30/2017 to document services personally performed by Alfredo Davila MD based on my observations and the provider's statements to me.        Alfredo Davila MD  05/31/17 1237

## 2017-05-31 NOTE — ED NOTES
Pt presents to ED with lower back pain, states he was getting up from laying down and the spasms and squeezing came on. Pt has no hx of back problems, pt took 10mg of Vicodin at home EMS gave 10mg of Morphine and 1 of Ativan. ABCs intact. A/Ox3

## 2017-05-31 NOTE — TELEPHONE ENCOUNTER
Call Type: Triage Call    Presenting Problem: Writer unable to perform triage assessment.  Patient states he is having a difficult time breathing due to severe  back pain.  Advised patient to hang up and call 911.  He states he  will do this now.  Triage Note:  Guideline Title: No Guideline - Advice Per Reference (Adult)  Recommended Disposition: Activate   Original Inclination: Wanted to speak with a nurse  Override Disposition:  Intended Action: Call 911  Physician Contacted: No  ACTIVATE  ?  YES  Physician Instructions:  Care Advice:

## 2017-05-31 NOTE — ED NOTES
Bed: ED27  Expected date: 5/30/17  Expected time: 10:06 PM  Means of arrival: Ambulance  Comments:  JOHN 52M

## 2017-05-31 NOTE — DISCHARGE INSTRUCTIONS
Please make an appointment to follow up with your primary care provider in 4-5 days if not improving.    Return to ER immediately if you develop: worsening pain, new numbness or weakness in legs, loss of bowel or bladder control, inability to urinate, Fever > 101, persistent nausea or vomiting OR you have any other concerns about your health.        Discharge Instructions  Back Pain  You were seen today for back pain. Back pain can have many causes, but most will get better without surgery or other specific treatment. Sometimes there is a herniated ( slipped ) disc. We don t usually do MRI scans to look for these right away, since most herniated discs will get better on their own with time.  Today, we did not find any evidence that your back pain was caused by a serious condition, such as an infection, fracture, or tumor. However, sometimes symptoms develop over time and cannot be found during an emergency visit, so it is very important that you follow up with your primary doctor.  Return to the Emergency Department if:    You develop a fever with your back pain.     You have weakness or change in sensation in one or both legs.    You lose control of your bowels or bladder, or can t empty your bladder.    Your pain gets much worse.     Follow-up with your doctor:    Unless your pain has completely gone away, please make an appointment with your doctor within one week.  You may need further management of your back pain, such as more pain medication, imaging such as an X-ray or MRI, or physical therapy.    What can I do to help myself?    Remain Active -- People are often afraid that they will hurt their back further or delay recovery by remaining active, but this is one of the best things you can do for your back. In fact, prolonged bed rest is not recommended. Studies have shown that people with low back pain recover faster when they remain active. Movement helps to bring blood flow to the muscles and relieve muscle  spasms as well as preventing loss of muscle strength.    Heat -- Using a heating pad can help with low back pain during the first few weeks. Do not sleep with a heating pad, as you can be burned.     Pain medications - You may take a pain medication such as Tylenol  (acetaminophen), Advil , Nuprin  (ibuprofen) or Aleve  (naproxen).  If you have been given a narcotic such as Vicodin  (hydrocodone with acetaminophen), Percocet  (oxycodone with acetaminophen), codeine, or a muscle relaxant such as Flexeril  (cyclobenzaprine) or Soma  (carisoprodol), do not drive for four hours after you have taken it. If the narcotic contains Tylenol  (acetaminophen), do not take Tylenol  with it. All narcotics will cause constipation, so eat a high fiber diet.   If you were given a prescription for medicine here today, be sure to read all of the information (including the package insert) that comes with your prescription.  This will include important information about the medicine, its side effects, and any warnings that you need to know about.  The pharmacist who fills the prescription can provide more information and answer questions you may have about the medicine.  If you have questions or concerns that the pharmacist cannot address, please call or return to the Emergency Department.   Opioid Medication Information    Pain medications are among the most commonly prescribed medicines, so we are including this information for all our patients. If you did not receive pain medication or get a prescription for pain medicine, you can ignore it.     You may have been given a prescription for an opioid (narcotic) pain medicine and/or have received a pain medicine while here in the Emergency Department. These medicines can make you drowsy or impaired. You must not drive, operate dangerous equipment, or engage in any other dangerous activities while taking these medications. If you drive while taking these medications, you could be arrested  for DUI, or driving under the influence. Do not drink any alcohol while you are taking these medications.     Opioid pain medications can cause addiction. If you have a history of chemical dependency of any type, you are at a higher risk of becoming addicted to pain medications.  Only take these prescribed medications to treat your pain when all other options have been tried. Take it for as short a time and as few doses as possible. Store your pain pills in a secure place, as they are frequently stolen and provide a dangerous opportunity for children or visitors in your house to start abusing these powerful medications. We will not replace any lost or stolen medicine.  As soon as your pain is better, you should flush all your remaining medication.     Many prescription pain medications contain Tylenol  (acetaminophen), including Vicodin , Tylenol #3 , Norco , Lortab , and Percocet .  You should not take any extra pills of Tylenol  if you are using these prescription medications or you can get very sick.  Do not ever take more than 3000 mg of acetaminophen in any 24 hour period.    All opioids tend to cause constipation. Drink plenty of water and eat foods that have a lot of fiber, such as fruits, vegetables, prune juice, apple juice and high fiber cereal.  Take a laxative if you don t move your bowels at least every other day. Miralax , Milk of Magnesia, Colace , or Senna  can be used to keep you regular.      Remember that you can always come back to the Emergency Department if you are not able to see your regular doctor in the amount of time listed above, if you get any new symptoms, or if there is anything that worries you.            What are Gallstones?  The gallbladder stores bile, a fluid made by the liver. Bile helps digest fats in the foods you eat. Gallstones form when certain substances in the bile crystallize and become solid. In some cases, the stones don t cause any symptoms. In others, they irritate  the walls of the gallbladder. More serious problems can occur if stones move into nearby ducts--such as the common bile duct--and cause blockages. This can block the flow of bile and can lead to pain, nausea, and infection.    Common symptoms  Gallbladder problems can cause painful attacks, often after a meal. Some people have only one attack. Others have many. Common symptoms include:    Severe, steady pain or aching in the upper right abdomen, back, or right shoulder blade, lasting from 30 minutes to several hours    A dull ache beneath the ribs or breastbone    Nausea, upset stomach, or vomiting    Jaundice (a buildup of bile chemicals in the blood), which causes yellowing of the skin and eyes, dark urine, and itching. Call your doctor immediately if this system develops.  Treating gallstones  If your stones are not causing symptoms, you may choose to delay treatment. But if you ve had one or more painful attacks, your doctor will likely recommend removing your gallbladder. This prevents more stones from forming and causing attacks. It also helps prevent complications, such as stones passing into the ducts and causing infection or pancreatitis. After the gallbladder is removed, your liver will still make bile to aid digestion.     If you re pregnant  Hormone changes during pregnancy can make bile more likely to form stones. If your gallbladder needs to be removed, your doctor will talk with you about the timing for surgery. In some cases, it can be delayed until after childbirth. In others, you may have surgery during pregnancy. This helps protect you and your baby s health.        8025-4422 [employer]. [address] [place] [address], [place] [address]. All rights reserved. This information is not intended as a substitute for professional medical care. Always follow your healthcare professional's instructions.

## 2017-06-05 ENCOUNTER — OFFICE VISIT (OUTPATIENT)
Dept: PEDIATRICS | Facility: CLINIC | Age: 52
End: 2017-06-05
Payer: COMMERCIAL

## 2017-06-05 ENCOUNTER — TELEPHONE (OUTPATIENT)
Dept: PEDIATRICS | Facility: CLINIC | Age: 52
End: 2017-06-05

## 2017-06-05 VITALS
DIASTOLIC BLOOD PRESSURE: 80 MMHG | WEIGHT: 146.8 LBS | SYSTOLIC BLOOD PRESSURE: 150 MMHG | OXYGEN SATURATION: 96 % | TEMPERATURE: 97.9 F | HEIGHT: 68 IN | HEART RATE: 63 BPM | BODY MASS INDEX: 22.25 KG/M2

## 2017-06-05 DIAGNOSIS — M62.830 BACK MUSCLE SPASM: Primary | ICD-10-CM

## 2017-06-05 PROCEDURE — 99213 OFFICE O/P EST LOW 20 MIN: CPT | Performed by: INTERNAL MEDICINE

## 2017-06-05 RX ORDER — METHOCARBAMOL 500 MG/1
500-1000 TABLET, FILM COATED ORAL 3 TIMES DAILY PRN
Qty: 30 TABLET | Refills: 1 | Status: SHIPPED | OUTPATIENT
Start: 2017-06-05 | End: 2019-04-22

## 2017-06-05 NOTE — PROGRESS NOTES
"  SUBJECTIVE:                                                    Taco Rooney is a 52 year old male who presents to clinic today for the following health issues:      ED/UC Followup:    Facility:  [facility]  Date of visit: 05/30    Reason for visit: Acute bilateral back pain, unspecified back location       Calculus of gallbladder without cholecystitis without obstruction    Current Status: Having ongoing stiffness and soreness     Had severe back spasms.  Evaluated in the ED as above.  Reviewed treatment from the ED.  Has taken a few of the cyclobenzaprine, but is causing drowsiness so he is reluctant to take the medication.  Is trying some stretching.  Heat helps somewhat.    Problem list and histories reviewed & adjusted, as indicated.        OBJECTIVE:                                                    /80 (Cuff Size: Adult Regular)  Pulse 63  Temp 97.9  F (36.6  C) (Oral)  Ht 5' 7.75\" (1.721 m)  Wt 146 lb 12.8 oz (66.6 kg)  SpO2 96%  BMI 22.49 kg/m2  Body mass index is 22.49 kg/(m^2).  GEN: No distress  SKIN: No rashes  LUNGS: Clear to auscultation bilaterally. No rhonchi, rales, wheezes or retractions.  CV: Regular rate and rhythm.  No murmurs, rubs or gallops. Pulses 2+ radial.  ABD: Bowel sounds positive throughout. Soft, nontender, nondistended. No organomegaly. No masses.  BACK: No point tenderness with palpation along entire breadth of spine. Tenderness with palpation along mid lumbar paraspinal region and in the upper piriformis region. No CVA tenderness.   EXTR: no edema  NEURO: no focal deficits appreciated.      ASSESSMENT/PLAN:                                                        ICD-10-CM    1. Back muscle spasm M62.830 methocarbamol (ROBAXIN) 500 MG tablet     Sx are most c/w spasm, not likely disc or primary spine cause.  Advised calling if sx do not continue to improve over the next few weeks    Patient Instructions   Stop Flexeril (cyclobenzaprine)    Start Robaxin (methocarbamol) " 500 mg tablets   1-2 tablets per dose, up to 3 times per day as needed to treat muscle spasms    Eriberto Strange MD  [facility]

## 2017-06-05 NOTE — NURSING NOTE
"Chief Complaint   Patient presents with     ER F/U       Initial /80 (Cuff Size: Adult Regular)  Pulse 63  Temp 97.9  F (36.6  C) (Oral)  Ht 5' 7.75\" (1.721 m)  Wt 146 lb 12.8 oz (66.6 kg)  SpO2 96%  BMI 22.49 kg/m2 Estimated body mass index is 22.49 kg/(m^2) as calculated from the following:    Height as of this encounter: 5' 7.75\" (1.721 m).    Weight as of this encounter: 146 lb 12.8 oz (66.6 kg).  Medication Reconciliation: ruby Guevara   "

## 2017-06-05 NOTE — PATIENT INSTRUCTIONS
Stop Flexeril (cyclobenzaprine)    Start Robaxin (methocarbamol) 500 mg tablets   1-2 tablets per dose, up to 3 times per day as needed to treat muscle spasms

## 2017-06-05 NOTE — TELEPHONE ENCOUNTER
Please do not close this encounter until this has been addressed.  (prior auth approved/denied, prescriber refusal to complete prior auth or medication changed/discontinued)    Prior Authorization needed on: Methocarbamol 500 MG  Drug NDC: [address]-77     Insurance: [employer] Part D  Rx BIN: [id number]  Rx PCN: [id number]  Member ID: [id number]  Insurance phone #: [phone]    Pharmacy NPI: [id number]  Pharmacy Phone #: [phone]  Pharmacy Fax #: [phone]    Please let us know if the PA gets approved or denied or if medication is changed    Thank You!  Nehal Arroyo [facility]

## 2017-06-06 NOTE — TELEPHONE ENCOUNTER
Initiated PA via cover my meds. Awaiting response is 3-5 business days    Taco Rooney (Li: [id number])  Methocarbamol 500MG tablets  Status: PA Request  Created: June 6th, 2017  Sent: June 6th, 2017    Amber Johnson MA 6/6/2017 7:45 AM

## 2017-06-07 ENCOUNTER — MYC MEDICAL ADVICE (OUTPATIENT)
Dept: PEDIATRICS | Facility: CLINIC | Age: 52
End: 2017-06-07

## 2017-06-07 DIAGNOSIS — G58.8 PUDENDAL NEURALGIA: ICD-10-CM

## 2017-06-07 DIAGNOSIS — G89.4 CHRONIC PAIN SYNDROME: ICD-10-CM

## 2017-06-07 RX ORDER — HYDROMORPHONE HYDROCHLORIDE 2 MG/1
2 TABLET ORAL EVERY 4 HOURS PRN
Qty: 60 TABLET | Refills: 0 | Status: SHIPPED | OUTPATIENT
Start: 2017-06-07 | End: 2017-07-19

## 2017-06-07 ASSESSMENT — ANXIETY QUESTIONNAIRES
3. WORRYING TOO MUCH ABOUT DIFFERENT THINGS: SEVERAL DAYS
2. NOT BEING ABLE TO STOP OR CONTROL WORRYING: SEVERAL DAYS
IF YOU CHECKED OFF ANY PROBLEMS ON THIS QUESTIONNAIRE, HOW DIFFICULT HAVE THESE PROBLEMS MADE IT FOR YOU TO DO YOUR WORK, TAKE CARE OF THINGS AT HOME, OR GET ALONG WITH OTHER PEOPLE: NOT DIFFICULT AT ALL
7. FEELING AFRAID AS IF SOMETHING AWFUL MIGHT HAPPEN: NOT AT ALL
6. BECOMING EASILY ANNOYED OR IRRITABLE: MORE THAN HALF THE DAYS
GAD7 TOTAL SCORE: 6
5. BEING SO RESTLESS THAT IT IS HARD TO SIT STILL: NOT AT ALL
1. FEELING NERVOUS, ANXIOUS, OR ON EDGE: SEVERAL DAYS

## 2017-06-07 ASSESSMENT — PATIENT HEALTH QUESTIONNAIRE - PHQ9: 5. POOR APPETITE OR OVEREATING: SEVERAL DAYS

## 2017-06-07 NOTE — TELEPHONE ENCOUNTER
"Dilaudid was d/c from med list on 05/12/17 for \"side effects\". No notes found in chart regarding that.     Dilaudid 2 mg      Last Written Prescription Date:  04/25/17  Last Fill Quantity: 60,   # refills: 0  Last Office Visit with [facility], [facility] or [facility] prescribing provider: 06/05/17  Future Office visit:       Routing refill request to provider for review/approval because:  Drug not on the [facility], [facility] or [facility] refill protocol or controlled substance    Marvin, RN  Triage Nurse            "

## 2017-06-08 ASSESSMENT — ANXIETY QUESTIONNAIRES: GAD7 TOTAL SCORE: 6

## 2017-06-08 ASSESSMENT — PATIENT HEALTH QUESTIONNAIRE - PHQ9: SUM OF ALL RESPONSES TO PHQ QUESTIONS 1-9: 4

## 2017-06-28 ENCOUNTER — MYC MEDICAL ADVICE (OUTPATIENT)
Dept: PEDIATRICS | Facility: CLINIC | Age: 52
End: 2017-06-28

## 2017-06-28 DIAGNOSIS — G58.8 PUDENDAL NEURALGIA: ICD-10-CM

## 2017-06-28 RX ORDER — HYDROCODONE BITARTRATE AND ACETAMINOPHEN 10; 325 MG/1; MG/1
.5-1 TABLET ORAL EVERY 8 HOURS PRN
Qty: 60 TABLET | Refills: 0 | Status: SHIPPED | OUTPATIENT
Start: 2017-06-28 | End: 2017-08-11

## 2017-06-28 NOTE — TELEPHONE ENCOUNTER
Manuelitooco      Last Written Prescription Date:  5/12/2017  Last Fill Quantity: 60,   # refills: 0  Last Office Visit with [facility], [facility] or [facility] prescribing provider: 6/5/2017  Future Office visit:       Routing refill request to provider for review/approval because:  Drug not on the [facility], [facility] or [facility] refill protocol or controlled substance    RX [place] reviewed:  reviewed- recommend provider review    Last refills:  Oxycodone 5mg-5/31/2017, #12 (Dr. Alfredo Davila) ED  Ambien-   6/14/2017, #30  5/16/2017, #30    Norco- 6/7/2017, #60    Dilaudid- 4/25/2017, #60    [place]:  [email or web address]    Tesha CLIFFORD RN, BSN, PHN  [facility] RN

## 2017-07-19 ENCOUNTER — MYC MEDICAL ADVICE (OUTPATIENT)
Dept: PEDIATRICS | Facility: CLINIC | Age: 52
End: 2017-07-19

## 2017-07-19 DIAGNOSIS — G58.8 PUDENDAL NEURALGIA: ICD-10-CM

## 2017-07-19 DIAGNOSIS — G89.4 CHRONIC PAIN SYNDROME: ICD-10-CM

## 2017-07-19 RX ORDER — HYDROMORPHONE HYDROCHLORIDE 2 MG/1
2 TABLET ORAL EVERY 4 HOURS PRN
Qty: 60 TABLET | Refills: 0 | Status: SHIPPED | OUTPATIENT
Start: 2017-07-19 | End: 2017-08-28

## 2017-07-19 NOTE — TELEPHONE ENCOUNTER
Please leave rx with E pharmacy when ready. Thanks.    Dilaudid 2 mg      Last Written Prescription Date:  06/07/17  Last Fill Quantity: 60,   # refills: 0  Last Office Visit with [facility] or [facility] prescribing provider: 06/05/17  Future Office visit:       Routing refill request to provider for review/approval because:  Drug not on the [facility], [facility] or  [employer] refill protocol or controlled substance    Marvin, RN  Triage Nurse

## 2017-08-11 ENCOUNTER — MYC MEDICAL ADVICE (OUTPATIENT)
Dept: PEDIATRICS | Facility: CLINIC | Age: 52
End: 2017-08-11

## 2017-08-11 DIAGNOSIS — G58.8 PUDENDAL NEURALGIA: ICD-10-CM

## 2017-08-11 RX ORDER — HYDROCODONE BITARTRATE AND ACETAMINOPHEN 10; 325 MG/1; MG/1
.5-1 TABLET ORAL EVERY 8 HOURS PRN
Qty: 60 TABLET | Refills: 0 | Status: SHIPPED | OUTPATIENT
Start: 2017-08-11 | End: 2017-11-14

## 2017-08-11 NOTE — TELEPHONE ENCOUNTER
Please leave rx with [facility] pharmacy when ready. Thanks.     Taina      Last Written Prescription Date:  06/28/17  Last Fill Quantity: 60,   # refills: 0  [facility] Office Visit with [facility] prescribing provider: 06/05/17  Future Office visit:       Routing refill request to provider for review/approval because:  Drug not on the [facility] or [facility] refill protocol or controlled substance    Marvin, RN  Triage Nurse

## 2017-08-28 ENCOUNTER — MYC MEDICAL ADVICE (OUTPATIENT)
Dept: PEDIATRICS | Facility: CLINIC | Age: 52
End: 2017-08-28

## 2017-08-28 DIAGNOSIS — E78.5 HYPERLIPIDEMIA WITH TARGET LDL LESS THAN 130: ICD-10-CM

## 2017-08-28 DIAGNOSIS — G89.4 CHRONIC PAIN SYNDROME: ICD-10-CM

## 2017-08-28 DIAGNOSIS — F51.01 PRIMARY INSOMNIA: ICD-10-CM

## 2017-08-28 DIAGNOSIS — G58.8 PUDENDAL NEURALGIA: ICD-10-CM

## 2017-08-28 RX ORDER — HYDROMORPHONE HYDROCHLORIDE 2 MG/1
2 TABLET ORAL EVERY 4 HOURS PRN
Qty: 60 TABLET | Refills: 0 | Status: SHIPPED | OUTPATIENT
Start: 2017-08-28 | End: 2017-09-27

## 2017-08-28 RX ORDER — ZOLPIDEM TARTRATE 10 MG/1
10 TABLET ORAL
Qty: 30 TABLET | Refills: 5 | Status: SHIPPED | OUTPATIENT
Start: 2017-08-28 | End: 2018-02-13

## 2017-08-28 RX ORDER — SIMVASTATIN 20 MG
20 TABLET ORAL AT BEDTIME
Qty: 90 TABLET | Refills: 1 | Status: SHIPPED | OUTPATIENT
Start: 2017-08-28 | End: 2017-12-11

## 2017-08-28 NOTE — TELEPHONE ENCOUNTER
See pt's MC message.     Hydromorphone 2 mg      Last Written Prescription Date:  07/19/17  Last Fill Quantity: 60,   # refills: 0  Zolpidem 10 mg      Last Written Prescription Date:  03/17/17  Last Fill Quantity: 30,   # refills: 5  Last Office Visit with [facility], [facility] or  [employer] prescribing provider: 06/05/17  Future Office visit:       Routing refill request to provider for review/approval because:  Drug not on the [facility], [facility] or  [facility] refill protocol or controlled substance    Simvastatin 20 mg     Last Written Prescription Date: 05/12/17  Last Fill Quantity: 90, # refills: 0  Last Office Visit with [facility], [facility] or [facility] prescribing provider: 06/05/17       Lab Results   Component Value Date    CHOL 139 01/14/2016     Lab Results   Component Value Date    HDL 39 01/14/2016     Lab Results   Component Value Date    LDL 69 01/14/2016     Lab Results   Component Value Date    TRIG 157 01/14/2016     Lab Results   Component Value Date    CHOLHDLRATIO 3.1 10/29/2014     Marvin, RN  Triage Nurse

## 2017-09-27 ENCOUNTER — MYC MEDICAL ADVICE (OUTPATIENT)
Dept: PEDIATRICS | Facility: CLINIC | Age: 52
End: 2017-09-27

## 2017-09-27 DIAGNOSIS — G89.4 CHRONIC PAIN SYNDROME: ICD-10-CM

## 2017-09-27 DIAGNOSIS — G58.8 PUDENDAL NEURALGIA: ICD-10-CM

## 2017-09-27 RX ORDER — HYDROMORPHONE HYDROCHLORIDE 2 MG/1
2 TABLET ORAL EVERY 4 HOURS PRN
Qty: 60 TABLET | Refills: 0 | Status: SHIPPED | OUTPATIENT
Start: 2017-09-27 | End: 2017-10-23

## 2017-09-27 NOTE — TELEPHONE ENCOUNTER
Please leave rx with E pharmacy when ready. Thanks.    Pt sent MC message as below:    I am taking a course in [place] and was wondering if I could get a re fill of my Hydromorphone before my Oct 3rd trip.     Dilaudid 2 mg      Last Written Prescription Date:  08/28/17  Last Fill Quantity: 60,   # refills: 0  Last Office Visit with [facility], [facility] or  [facility] prescribing provider: 06/05/17  Future Office visit:       Routing refill request to provider for review/approval because:  Drug not on the [facility], [facility] or  [facility] refill protocol or controlled substance    Marvin, RN  Triage Nurse

## 2017-10-23 ENCOUNTER — MYC MEDICAL ADVICE (OUTPATIENT)
Dept: PEDIATRICS | Facility: CLINIC | Age: 52
End: 2017-10-23

## 2017-10-23 DIAGNOSIS — G89.4 CHRONIC PAIN SYNDROME: ICD-10-CM

## 2017-10-23 DIAGNOSIS — G58.8 PUDENDAL NEURALGIA: ICD-10-CM

## 2017-10-23 RX ORDER — HYDROMORPHONE HYDROCHLORIDE 2 MG/1
2 TABLET ORAL EVERY 4 HOURS PRN
Qty: 60 TABLET | Refills: 0 | Status: SHIPPED | OUTPATIENT
Start: 2017-10-23 | End: 2017-12-11

## 2017-10-23 NOTE — TELEPHONE ENCOUNTER
Please leave rx with E pharmacy when ready. Thanks.    Hydromorphone 2 mg      Last Written Prescription Date:  09/27/17  Last Fill Quantity: 60,   # refills: 0  Future Office visit:       Routing refill request to provider for review/approval because:  Drug not on the [facility], [facility] or [facility] refill protocol or controlled substance    Marvin, RN  Triage Nurse

## 2017-11-14 ENCOUNTER — MYC MEDICAL ADVICE (OUTPATIENT)
Dept: PEDIATRICS | Facility: CLINIC | Age: 52
End: 2017-11-14

## 2017-11-14 DIAGNOSIS — G58.8 PUDENDAL NEURALGIA: ICD-10-CM

## 2017-11-14 RX ORDER — HYDROCODONE BITARTRATE AND ACETAMINOPHEN 10; 325 MG/1; MG/1
.5-1 TABLET ORAL EVERY 8 HOURS PRN
Qty: 60 TABLET | Refills: 0 | Status: SHIPPED | OUTPATIENT
Start: 2017-11-14 | End: 2017-12-29

## 2017-11-14 NOTE — TELEPHONE ENCOUNTER
Norco       Last Written Prescription Date:  8/11/17  Last Fill Quantity: 60,   # refills: 0  Last visit 6/5/17  Future Office visit:      checked  Routing refill request to provider for review/approval because:  Drug not on the [facility], [facility] or  Health refill protocol or controlled substance    Zamzam Zuleta RN

## 2018-02-13 ENCOUNTER — OFFICE VISIT (OUTPATIENT)
Dept: PEDIATRICS | Facility: CLINIC | Age: 53
End: 2018-02-13
Payer: MEDICARE

## 2018-02-13 VITALS
BODY MASS INDEX: 21.86 KG/M2 | HEART RATE: 92 BPM | WEIGHT: 144.25 LBS | HEIGHT: 68 IN | DIASTOLIC BLOOD PRESSURE: 84 MMHG | TEMPERATURE: 97.5 F | SYSTOLIC BLOOD PRESSURE: 134 MMHG | OXYGEN SATURATION: 97 %

## 2018-02-13 DIAGNOSIS — F51.01 PRIMARY INSOMNIA: ICD-10-CM

## 2018-02-13 DIAGNOSIS — J06.9 VIRAL URI: Primary | ICD-10-CM

## 2018-02-13 DIAGNOSIS — G58.8 PUDENDAL NEURALGIA: ICD-10-CM

## 2018-02-13 DIAGNOSIS — G89.4 CHRONIC PAIN SYNDROME: ICD-10-CM

## 2018-02-13 PROCEDURE — 99213 OFFICE O/P EST LOW 20 MIN: CPT | Performed by: INTERNAL MEDICINE

## 2018-02-13 RX ORDER — ZOLPIDEM TARTRATE 10 MG/1
10 TABLET ORAL
Qty: 30 TABLET | Refills: 5 | Status: SHIPPED | OUTPATIENT
Start: 2018-02-13 | End: 2018-08-03

## 2018-02-13 RX ORDER — HYDROCODONE BITARTRATE AND ACETAMINOPHEN 10; 325 MG/1; MG/1
.5-1 TABLET ORAL EVERY 8 HOURS PRN
Qty: 60 TABLET | Refills: 0 | Status: SHIPPED | OUTPATIENT
Start: 2018-02-13 | End: 2018-02-17

## 2018-02-13 RX ORDER — HYDROMORPHONE HYDROCHLORIDE 2 MG/1
2 TABLET ORAL EVERY 4 HOURS PRN
Qty: 60 TABLET | Refills: 0 | Status: SHIPPED | OUTPATIENT
Start: 2018-02-13 | End: 2018-04-02

## 2018-02-13 NOTE — PROGRESS NOTES
"  SUBJECTIVE:   Taco Rooney is a 52 year old male who presents to clinic today for the following health issues:      RESPIRATORY SYMPTOMS      Duration: x5 days     Description  nasal congestion, rhinorrhea, chills, fatigue/malaise and hoarse voice    Severity: moderate    Accompanying signs and symptoms: None    History (predisposing factors):  none    Precipitating or alleviating factors: None    Therapies tried and outcome:  Iva Claudio    Also in need of refill for hyrdomorphone and zolpidem.     Problem list and histories reviewed & adjusted, as indicated.    Reviewed and updated as needed this visit by Provider  Tobacco  Allergies  Meds  Problems  Med Hx  Surg Hx  Fam Hx  Soc Hx          ROS:  Constitutional, HEENT, cardiovascular, pulmonary, gi and gu systems are negative, except as otherwise noted.    OBJECTIVE:     /84  Pulse 92  Temp 97.5  F (36.4  C) (Tympanic)  Ht 5' 7.75\" (1.721 m)  Wt 144 lb 4 oz (65.4 kg)  SpO2 97%  BMI 22.1 kg/m2  Body mass index is 22.1 kg/(m^2).  GEN: no distress  HEENT: PERRL. EOMI. TM's clear bilaterally. Nasal mucosa edematous. OP moist without lesions.  NECK: Supple. No LAD or TM.  LUNGS: Clear to auscultation bilaterally. No rhonchi, rales, wheezes or retractions.  CV: Regular rate and rhythm.  No murmurs, rubs or gallops. Pulses 2+ radial.  EXTR: no LE edema    ASSESSMENT/PLAN:       ICD-10-CM    1. 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 Chronic pain syndrome G89.4 HYDROmorphone (DILAUDID) 2 MG tablet   4. Insomnia F51.01 zolpidem (AMBIEN) 10 MG tablet     URI, most likely viral cause.  Discussed symptomatic management.     Refilled hyeromorphone and zolpidem.       Eriberto Strange MD  [facility]    "

## 2018-02-13 NOTE — NURSING NOTE
"Chief Complaint   Patient presents with     Cough       Initial BP (!) 138/92 (BP Location: Right arm, Patient Position: Chair, Cuff Size: Adult Large)  Pulse 92  Temp 97.5  F (36.4  C) (Tympanic)  Ht 5' 7.75\" (1.721 m)  Wt 144 lb 4 oz (65.4 kg)  SpO2 97%  BMI 22.1 kg/m2 Estimated body mass index is 22.1 kg/(m^2) as calculated from the following:    Height as of this encounter: 5' 7.75\" (1.721 m).    Weight as of this encounter: 144 lb 4 oz (65.4 kg).  Medication Reconciliation: complete     Amber Johnson MA 2/13/2018 4:08 PM    "

## 2018-03-06 ENCOUNTER — MYC MEDICAL ADVICE (OUTPATIENT)
Dept: PEDIATRICS | Facility: CLINIC | Age: 53
End: 2018-03-06

## 2018-03-06 DIAGNOSIS — G89.4 CHRONIC PAIN SYNDROME: ICD-10-CM

## 2018-03-06 DIAGNOSIS — G58.8 PUDENDAL NEURALGIA: ICD-10-CM

## 2018-03-07 RX ORDER — HYDROMORPHONE HYDROCHLORIDE 2 MG/1
2 TABLET ORAL EVERY 4 HOURS PRN
Qty: 60 TABLET | Refills: 0 | Status: CANCELLED | OUTPATIENT
Start: 2018-03-07

## 2018-03-07 RX ORDER — HYDROCODONE BITARTRATE AND ACETAMINOPHEN 10; 325 MG/1; MG/1
.5-1 TABLET ORAL EVERY 8 HOURS PRN
Qty: 60 TABLET | Refills: 0 | Status: SHIPPED | OUTPATIENT
Start: 2018-03-07 | End: 2018-04-23

## 2018-03-07 NOTE — TELEPHONE ENCOUNTER
Please leave rx with E pharmacy when ready. Thanks.     Dilaudid 2 mg      Last Written Prescription Date:  02/13/18  Last Fill Quantity: 60,   # refills: 0  Last Office Visit: 02/13/18  Future Office visit:       Routing refill request to provider for review/approval because:  Drug not on the [facility]G, UMP or [facility] refill protocol or controlled substance    Marvin, RN  Triage Nurse

## 2018-03-07 NOTE — TELEPHONE ENCOUNTER
Chart reviewed.  Is not due for hydromorphone at this time.  OK to refill hydrocodone (last rx was 1/2/18)  Rx in my outbasket.

## 2018-04-01 ENCOUNTER — MYC MEDICAL ADVICE (OUTPATIENT)
Dept: PEDIATRICS | Facility: CLINIC | Age: 53
End: 2018-04-01

## 2018-04-01 DIAGNOSIS — E78.5 HYPERLIPIDEMIA WITH TARGET LDL LESS THAN 130: ICD-10-CM

## 2018-04-01 DIAGNOSIS — G89.4 CHRONIC PAIN SYNDROME: ICD-10-CM

## 2018-04-01 DIAGNOSIS — G58.8 PUDENDAL NEURALGIA: ICD-10-CM

## 2018-04-02 RX ORDER — SIMVASTATIN 20 MG
20 TABLET ORAL AT BEDTIME
Qty: 90 TABLET | Refills: 0 | Status: SHIPPED | OUTPATIENT
Start: 2018-04-02 | End: 2018-07-17

## 2018-04-02 RX ORDER — HYDROMORPHONE HYDROCHLORIDE 2 MG/1
2 TABLET ORAL EVERY 4 HOURS PRN
Qty: 60 TABLET | Refills: 0 | Status: SHIPPED | OUTPATIENT
Start: 2018-04-02 | End: 2018-05-16

## 2018-04-02 NOTE — TELEPHONE ENCOUNTER
Dilaudid signed, in my outbasket.    Simvastatin refilled.    Christine Castillo MD  Internal Medicine-Pediatrics

## 2018-04-02 NOTE — TELEPHONE ENCOUNTER
Dilaudid      Last Written Prescription Date:  2/13/2018  Last Fill Quantity: 60,   # refills: 0  Last Office Visit: 2/13/2018  Future Office visit:       Routing refill request to provider for review/approval because:  Drug not on the FMG, UMP or  Health refill protocol or controlled substance    [place] reviewed:  reviewed- no concerns    [place]:  [email or web address]    Last Filled    Norco  3/10/2018, #60    Zolpidem  3/20/2018, #30  2/20/2018, #30    Diluadid  2/15/2018, #60      Tesha CLIFFORD RN, BSN, PHN  [place] [facility] RN

## 2018-04-23 ENCOUNTER — MYC MEDICAL ADVICE (OUTPATIENT)
Dept: PEDIATRICS | Facility: CLINIC | Age: 53
End: 2018-04-23

## 2018-04-23 DIAGNOSIS — G58.8 PUDENDAL NEURALGIA: ICD-10-CM

## 2018-04-23 RX ORDER — HYDROCODONE BITARTRATE AND ACETAMINOPHEN 10; 325 MG/1; MG/1
.5-1 TABLET ORAL EVERY 8 HOURS PRN
Qty: 60 TABLET | Refills: 0 | Status: SHIPPED | OUTPATIENT
Start: 2018-04-23 | End: 2018-06-29

## 2018-05-16 ENCOUNTER — MYC MEDICAL ADVICE (OUTPATIENT)
Dept: PEDIATRICS | Facility: CLINIC | Age: 53
End: 2018-05-16

## 2018-05-16 DIAGNOSIS — G58.8 PUDENDAL NEURALGIA: ICD-10-CM

## 2018-05-16 DIAGNOSIS — G89.4 CHRONIC PAIN SYNDROME: ICD-10-CM

## 2018-05-16 RX ORDER — HYDROMORPHONE HYDROCHLORIDE 2 MG/1
2 TABLET ORAL EVERY 4 HOURS PRN
Qty: 60 TABLET | Refills: 0 | Status: SHIPPED | OUTPATIENT
Start: 2018-05-16 | End: 2018-06-08

## 2018-05-16 NOTE — TELEPHONE ENCOUNTER
Please leave rx with E pharmacy when ready. Thanks.    Dilaudid 2 mg      Last Written Prescription Date:  04/02/18  Last Fill Quantity: 60,   # refills: 0  Last Office Visit: 02/13/18  Future Office visit:       Routing refill request to provider for review/approval because:  Drug not on the FMG, UMP or [facility] refill protocol or controlled substance    Marvin, RN  Triage Nurse

## 2018-06-07 ENCOUNTER — MYC MEDICAL ADVICE (OUTPATIENT)
Dept: PEDIATRICS | Facility: CLINIC | Age: 53
End: 2018-06-07

## 2018-06-07 DIAGNOSIS — G89.4 CHRONIC PAIN SYNDROME: ICD-10-CM

## 2018-06-07 DIAGNOSIS — G58.8 PUDENDAL NEURALGIA: ICD-10-CM

## 2018-06-08 RX ORDER — HYDROMORPHONE HYDROCHLORIDE 2 MG/1
2 TABLET ORAL EVERY 4 HOURS PRN
Qty: 60 TABLET | Refills: 0 | Status: SHIPPED | OUTPATIENT
Start: 2018-06-08 | End: 2018-07-26

## 2018-06-08 NOTE — TELEPHONE ENCOUNTER
Rx brought to [facility] pharmacy, patient notified via [employer]. Carlene Caraballo, CRYSTAL

## 2018-06-19 ENCOUNTER — TELEPHONE (OUTPATIENT)
Dept: PEDIATRICS | Facility: CLINIC | Age: 53
End: 2018-06-19

## 2018-06-19 NOTE — TELEPHONE ENCOUNTER
6/19/2018    Call Regarding Preventive Health Screening Colonoscopy/ FIT    Attempt 1    Message on voicemail     Comments:       Outreach   CC

## 2018-06-29 ENCOUNTER — MYC MEDICAL ADVICE (OUTPATIENT)
Dept: PEDIATRICS | Facility: CLINIC | Age: 53
End: 2018-06-29

## 2018-06-29 DIAGNOSIS — G58.8 PUDENDAL NEURALGIA: ICD-10-CM

## 2018-06-29 RX ORDER — HYDROCODONE BITARTRATE AND ACETAMINOPHEN 10; 325 MG/1; MG/1
.5-1 TABLET ORAL EVERY 8 HOURS PRN
Qty: 60 TABLET | Refills: 0 | Status: SHIPPED | OUTPATIENT
Start: 2018-06-29 | End: 2018-12-12

## 2018-06-29 NOTE — TELEPHONE ENCOUNTER
Please leave rx with E pharmacy when ready. Thanks.    Controlled Substance Refill Request for Norco  mg(04/23/18 60#)    Problem List Complete:  Yes  Patient is followed by NADYA JARA for ongoing prescription of pain medication.  All refills should be approved by this provider, or covering partner.  Medication(s): Hydromorphone 2 mg, Vicodin 5/325.   Maximum quantity per month: 60, 30  Clinic visit frequency required: Q 3 months   Controlled substance agreement: No     checked in past 3 months?  Yes Last 3 refills were 4/23, 3/10 & 1/2 for 60# each    Marvin, RN  Triage Nurse

## 2018-07-17 ENCOUNTER — MYC MEDICAL ADVICE (OUTPATIENT)
Dept: PEDIATRICS | Facility: CLINIC | Age: 53
End: 2018-07-17

## 2018-07-17 DIAGNOSIS — E78.5 HYPERLIPIDEMIA WITH TARGET LDL LESS THAN 130: ICD-10-CM

## 2018-07-17 DIAGNOSIS — Z12.5 SCREENING PSA (PROSTATE SPECIFIC ANTIGEN): Primary | ICD-10-CM

## 2018-07-17 RX ORDER — SIMVASTATIN 20 MG
20 TABLET ORAL AT BEDTIME
Qty: 30 TABLET | Refills: 0 | Status: SHIPPED | OUTPATIENT
Start: 2018-07-17 | End: 2018-09-04

## 2018-07-17 NOTE — TELEPHONE ENCOUNTER
Pt is over due for px & fasting labs. Sent one month supply on simvastatin to [facility] pharmacy. Sent  message advising pt to schedule an appointment with  for px & fasting labs.     Pt sent  message requesting refill on simvastatin. LOV was on 2/13/18(URI sx's & pain), ED f/u appointment on 6/5/17.  Last fasting labs were done on 1/14/16. Pt no showed for his px appointment on 1/3/18.     simvastatin (ZOCOR) 20 MG tablet 90 tablet 0 4/2/2018  No   Sig - Route: Take 1 tablet (20 mg) by mouth At Bedtime - Oral     Marvin RN  Triage Nurse

## 2018-07-18 ENCOUNTER — MYC MEDICAL ADVICE (OUTPATIENT)
Dept: PEDIATRICS | Facility: CLINIC | Age: 53
End: 2018-07-18

## 2018-07-18 NOTE — TELEPHONE ENCOUNTER
SAMANTHAI:  Pt sent a [employer] message back stating that he recently lost his [employer] insurance and isn't sure if Medicare covers much.  He will work on finding another health insurance company.    Carolyn Carroll RN

## 2018-07-24 NOTE — TELEPHONE ENCOUNTER
Per outreach, patient is aware of overdue screening, states he no longer has his insurance and will call on their own time for scheduling.     Screening: Preventive Health Screening Colonoscopy/fit    Thanks

## 2018-07-25 ENCOUNTER — MYC MEDICAL ADVICE (OUTPATIENT)
Dept: PEDIATRICS | Facility: CLINIC | Age: 53
End: 2018-07-25

## 2018-07-25 DIAGNOSIS — G89.4 CHRONIC PAIN SYNDROME: ICD-10-CM

## 2018-07-25 DIAGNOSIS — G58.8 PUDENDAL NEURALGIA: ICD-10-CM

## 2018-07-26 NOTE — TELEPHONE ENCOUNTER
Please leave rx with E pharmacy when ready. Thanks.     Dilaudid 2 mg      Last Written Prescription Date:  6/8/18  Last Fill Quantity: 60,   # refills: 0  Last Office Visit: 02/13/18  Future Office visit:        Routing refill request to provider for review/approval because:  Drug not on the FMG, UMP or [facility] refill protocol or controlled substance     Marvin, RN  Triage Nurse

## 2018-07-27 RX ORDER — HYDROMORPHONE HYDROCHLORIDE 2 MG/1
2 TABLET ORAL EVERY 4 HOURS PRN
Qty: 60 TABLET | Refills: 0 | Status: SHIPPED | OUTPATIENT
Start: 2018-07-27 | End: 2018-08-24

## 2018-07-27 NOTE — TELEPHONE ENCOUNTER
Rx refilled. In my outbasket.     Please call pt - he should schedule a f/u visit with me in the next few weeks for routine f/u (last OV nearly 6 months ago).

## 2018-08-24 ENCOUNTER — MYC MEDICAL ADVICE (OUTPATIENT)
Dept: PEDIATRICS | Facility: CLINIC | Age: 53
End: 2018-08-24

## 2018-08-24 DIAGNOSIS — G58.8 PUDENDAL NEURALGIA: ICD-10-CM

## 2018-08-24 DIAGNOSIS — G89.4 CHRONIC PAIN SYNDROME: ICD-10-CM

## 2018-08-24 RX ORDER — HYDROMORPHONE HYDROCHLORIDE 2 MG/1
2 TABLET ORAL EVERY 4 HOURS PRN
Qty: 60 TABLET | Refills: 0 | Status: SHIPPED | OUTPATIENT
Start: 2018-08-24 | End: 2018-09-20

## 2018-09-04 DIAGNOSIS — E78.5 HYPERLIPIDEMIA WITH TARGET LDL LESS THAN 130: ICD-10-CM

## 2018-09-04 NOTE — TELEPHONE ENCOUNTER
"Requested Prescriptions   Pending Prescriptions Disp Refills     simvastatin (ZOCOR) 20 MG tablet [Pharmacy Med Name: SIMVASTATIN 20MG TABS]    Last Written Prescription Date:  7/17/2018  Last Fill Quantity: 30,  # refills: 0   Last office visit: 2/13/2018 with prescribing provider:  Eriberto Strange     Future Office Visit:     30 tablet 0     Sig: TAKE ONE TABLET BY MOUTH EVERY NIGHT AT BEDTIME (DUE FOR OFFICE VISIT AND FASTING LABS)    Statins Protocol Failed    9/4/2018  9:40 AM       Failed - LDL on file in past 12 months    Recent Labs   Lab Test  01/14/16   0713   LDL  69            Passed - No abnormal creatine kinase in past 12 months    No lab results found.            Passed - Recent (12 mo) or future (30 days) visit within the authorizing provider's specialty    Patient had office visit in the last 12 months or has a visit in the next 30 days with authorizing provider or within the authorizing provider's specialty.  See \"Patient Info\" tab in inbasket, or \"Choose Columns\" in Meds & Orders section of the refill encounter.           Passed - Patient is age 18 or older          "

## 2018-09-05 RX ORDER — SIMVASTATIN 20 MG
TABLET ORAL
Qty: 30 TABLET | Refills: 0 | Status: SHIPPED | OUTPATIENT
Start: 2018-09-05 | End: 2018-12-12

## 2018-09-05 NOTE — TELEPHONE ENCOUNTER
Please call:  He needs OV with fasting labs (could do physical if he wants) prior to additional refills.

## 2018-09-20 ENCOUNTER — MYC MEDICAL ADVICE (OUTPATIENT)
Dept: PEDIATRICS | Facility: CLINIC | Age: 53
End: 2018-09-20

## 2018-09-20 DIAGNOSIS — G58.8 PUDENDAL NEURALGIA: ICD-10-CM

## 2018-09-20 DIAGNOSIS — G89.4 CHRONIC PAIN SYNDROME: ICD-10-CM

## 2018-09-20 NOTE — TELEPHONE ENCOUNTER
Please leave rx with [facility] pharmacy when ready. Thanks. Pt is over due for px & fasting labs.     Dilaudid 2 mg      Last Written Prescription Date:  8/24/18  Last Fill Quantity: 60,   # refills: 0  Last Office Visit: 2/13/18  Future Office visit:       Routing refill request to provider for review/approval because:  Drug not on the FMG, P or  Health refill protocol or controlled substance    Marvin, RN  Triage Nurse

## 2018-09-21 RX ORDER — HYDROMORPHONE HYDROCHLORIDE 2 MG/1
2 TABLET ORAL EVERY 4 HOURS PRN
Qty: 60 TABLET | Refills: 0 | Status: SHIPPED | OUTPATIENT
Start: 2018-09-21 | End: 2018-10-18

## 2018-09-21 NOTE — TELEPHONE ENCOUNTER
Called and spoke with patient, informed him that he needs to be seen and have fasting labs before Zocor will be filled again. I offered to schedule but he said he will call back to schedule. Informed him to be seen before November 5 so that he doesn't run out of the RX.  Amirah Jin CMA  September 21, 2018, 8:43 AM

## 2018-10-18 ENCOUNTER — MYC MEDICAL ADVICE (OUTPATIENT)
Dept: PEDIATRICS | Facility: CLINIC | Age: 53
End: 2018-10-18

## 2018-10-18 DIAGNOSIS — G58.8 PUDENDAL NEURALGIA: ICD-10-CM

## 2018-10-18 DIAGNOSIS — G89.4 CHRONIC PAIN SYNDROME: ICD-10-CM

## 2018-10-18 RX ORDER — HYDROMORPHONE HYDROCHLORIDE 2 MG/1
2 TABLET ORAL EVERY 4 HOURS PRN
Qty: 60 TABLET | Refills: 0 | Status: SHIPPED | OUTPATIENT
Start: 2018-10-18 | End: 2018-11-15

## 2018-10-18 NOTE — TELEPHONE ENCOUNTER
Please leave rx with E pharmacy when ready. Thanks.    Dilaudid 2 mg     Last Written Prescription Date:  9/21/18  Last Fill Quantity: 60,   # refills: 0  Last Office Visit: 2/13/18  Future Office visit:       Routing refill request to provider for review/approval because:  Drug not on the FMG, UMP or [facility] refill protocol or controlled substance    Marvin, RN  Triage Nurse

## 2018-11-15 ENCOUNTER — MYC MEDICAL ADVICE (OUTPATIENT)
Dept: PEDIATRICS | Facility: CLINIC | Age: 53
End: 2018-11-15

## 2018-11-15 DIAGNOSIS — G58.8 PUDENDAL NEURALGIA: ICD-10-CM

## 2018-11-15 DIAGNOSIS — G89.4 CHRONIC PAIN SYNDROME: ICD-10-CM

## 2018-11-15 NOTE — TELEPHONE ENCOUNTER
: Can we discontinue Roberts from his med list? Please leave rx with E pharmacy when ready. Thanks.     As per : Last 6 refills were 10/19, 9/21, 8/24, 7/27, 6/8 & 5/16 for 60# each    Pt sent MC message as below:  Good morning,    I hope to get a re fill of my Hydromorphone (dilaudid) by the weekend.   I moved into my 99 year old [facility] in September to take care of her 24/7..and I'll be taking my first 'break'   up to the [place] and I will definately need some pain meds..     ..you might noticed I have not taken any Vicodin in a couple month's ? [id number];     Thanks much,   And I hope you guys have a Happy Thanksgiving!   Taco Toribio     Dilaudid 2 mg     Last Written Prescription Date:  10/18/18  Last Fill Quantity: 60,   # refills: 0  Last Office Visit: 2/13/18  Future Office visit:       Routing refill request to provider for review/approval because:  Drug not on the FMG, UMP or  Health refill protocol or controlled substance    Marvin, RN  Triage Nurse

## 2018-11-16 RX ORDER — HYDROMORPHONE HYDROCHLORIDE 2 MG/1
2 TABLET ORAL EVERY 4 HOURS PRN
Qty: 60 TABLET | Refills: 0 | Status: SHIPPED | OUTPATIENT
Start: 2018-11-16 | End: 2018-12-12

## 2018-11-16 NOTE — TELEPHONE ENCOUNTER
RX brought to [facility] pharmacy. Patient notified via mychart. Carlene Caraballo CMA

## 2018-11-23 ENCOUNTER — TELEPHONE (OUTPATIENT)
Dept: PEDIATRICS | Facility: CLINIC | Age: 53
End: 2018-11-23

## 2018-11-23 NOTE — TELEPHONE ENCOUNTER
Panel Management Review      Patient has the following on his problem list:     Depression / Dysthymia review    Measure:  Needs PHQ-9 score of 4 or less during index window.  Administer PHQ-9 and if score is 5 or more, send encounter to provider for next steps.    5 - 7 month window range: 6    PHQ-9 SCORE 9/9/2009 7/11/2012 6/5/2017   Total Score 6 9 -   Total Score - - 4       If PHQ-9 recheck is 5 or more, route to provider for next steps.    Patient is due for:  PHQ9  None      Composite cancer screening  Chart review shows that this patient is due/due soon for the following Colonoscopy  Summary:    Patient is due/failing the following:   JOSE, COLONOSCOPY and PHQ9    Action needed:   Pt needs to schedule colonoscopy and  Needs Follow-up visit for depresssion/Jose    Type of outreach:    Unable to leave message. Phone disconnected    Questions for provider review:    None                                                                                                                                    Marialuisa WARNER, CRYSTAL,AAMA     Chart routed to Care Team .

## 2018-12-11 NOTE — PROGRESS NOTES
"  SUBJECTIVE:   Taco Rooney is a 53 year old male who presents to clinic today for the following health issues:      Dizziness      Duration: x2 weeks, worse in the morning, \" lately has been all day\"     Description    Feeling faint:  no   Feeling like the surroundings are moving: YES  Loss of consciousness or falls: no     Intensity:  moderate    Accompanying signs and symptoms:   Nausea/vomitting: no   Palpitations: no   Weakness in arms or legs: no   Vision or speech changes: no   Ringing in ears (Tinnitus): no   Hearing loss related to dizziness: no   Other (fevers/chills/sweating/dyspnea): no     History (similar episodes/head trauma/previous evaluation/recent bleeding): None    Precipitating or alleviating factors (new meds/chemicals): None  Worse with activity/head movement: YES    Therapies tried and outcome: None    Had a 2-day span of vertigo throughout both days.  Worse w/ position change.    Sx improved.    Occurred for the entire day again earlier this week.    Notes sx more often in the morning, tends to improve throughout the day.  Does need to use the wall at times when walking.     Currently helping his grandmother (99 years old) 24 hours a day.      Hyperlipidemia. Reviewed lab results.  Lab Results   Component Value Date    LDL 69 01/14/2016     Has been missing some simvastatin doses.  Not fasting today.    Chronic pain - pudendal area.  Treating w/ hydromorphone. Approx 2 tabs per day.     Problem list and histories reviewed & adjusted, as indicated.    Labs reviewed in EPIC    Reviewed and updated as needed this visit by Provider  Tobacco  Allergies  Meds  Problems  Med Hx  Surg Hx  Fam Hx         ROS:  Constitutional, HEENT, cardiovascular, pulmonary, gi and gu systems are negative, except as otherwise noted.    OBJECTIVE:     /76 (BP Location: Right arm, Patient Position: Chair, Cuff Size: Adult Large)   Pulse 70   Temp 98.2  F (36.8  C) (Tympanic)   Ht 1.721 m (5' 7.75\")  "  Wt 66.8 kg (147 lb 4 oz)   SpO2 98%   BMI 22.55 kg/m    Body mass index is 22.55 kg/m .  GEN: No distress  HEENT: PERRL. EOMI. TM's clear bilaterally. Nasal mucosa normal. OP moist without lesions.  NECK: Supple. No LAD or TM. No bruits.  LUNGS: Clear to auscultation bilaterally. No rhonchi, rales, wheezes or retractions.  CV: Regular rate and rhythm.  No murmurs, rubs or gallops. Pulses 2+ radial.  ABD: BS+. S, ND, NT.   EXTR: no edema  NEURO: Alert, Oriented X3. CN 2-12 grossly intact. Strength 5/5 throughout. No focal sensory deficits. Gait is normal.     [place]-Hallpike maneuver completed - right side. Had vertigo sx in 2 positions. Improved after testing complete.     ASSESSMENT/PLAN:       ICD-10-CM    1. Benign paroxysmal positional vertigo, unspecified laterality H81.10    2. Hyperlipidemia with target LDL less than 130 E78.5 simvastatin (ZOCOR) 20 MG tablet   3. Chronic pain syndrome G89.4 HYDROmorphone (DILAUDID) 2 MG tablet   4. 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 Discussed management. Home handout. Call if wants PT referral.      Patient Instructions   Try home exercises    If you continue to have symptoms, call for a physical therapy referral             Rx refilled. Plan fasting labs in 2-3 months.     Eriberto Strange MD  [facility]

## 2018-12-12 ENCOUNTER — OFFICE VISIT (OUTPATIENT)
Dept: PEDIATRICS | Facility: CLINIC | Age: 53
End: 2018-12-12
Payer: MEDICARE

## 2018-12-12 VITALS
BODY MASS INDEX: 22.32 KG/M2 | HEART RATE: 70 BPM | HEIGHT: 68 IN | TEMPERATURE: 98.2 F | OXYGEN SATURATION: 98 % | SYSTOLIC BLOOD PRESSURE: 134 MMHG | DIASTOLIC BLOOD PRESSURE: 76 MMHG | WEIGHT: 147.25 LBS

## 2018-12-12 DIAGNOSIS — G89.4 CHRONIC PAIN SYNDROME: ICD-10-CM

## 2018-12-12 DIAGNOSIS — G58.8 PUDENDAL NEURALGIA: ICD-10-CM

## 2018-12-12 DIAGNOSIS — E78.5 HYPERLIPIDEMIA WITH TARGET LDL LESS THAN 130: ICD-10-CM

## 2018-12-12 DIAGNOSIS — H81.10 BENIGN PAROXYSMAL POSITIONAL VERTIGO, UNSPECIFIED LATERALITY: Primary | ICD-10-CM

## 2018-12-12 PROCEDURE — 99214 OFFICE O/P EST MOD 30 MIN: CPT | Performed by: INTERNAL MEDICINE

## 2018-12-12 RX ORDER — SIMVASTATIN 20 MG
TABLET ORAL
Qty: 90 TABLET | Refills: 1 | Status: SHIPPED | OUTPATIENT
Start: 2018-12-12 | End: 2019-07-16

## 2018-12-12 RX ORDER — HYDROMORPHONE HYDROCHLORIDE 2 MG/1
2 TABLET ORAL EVERY 4 HOURS PRN
Qty: 60 TABLET | Refills: 0 | Status: SHIPPED | OUTPATIENT
Start: 2018-12-12 | End: 2019-01-07

## 2018-12-12 ASSESSMENT — MIFFLIN-ST. JEOR: SCORE: 1473.45

## 2018-12-12 NOTE — PATIENT INSTRUCTIONS
Try home exercises    If you continue to have symptoms, call for a physical therapy referral             Positional Vertigo          What is positional vertigo?   Positional vertigo is an inner ear problem. It causes brief but sometimes severe feelings of spinning. Some people feel that their head or body is spinning. Others feel the room is spinning. People often say they are dizzy, but dizzy is a very general term. Vertigo, on the other hand, is the very specific feeling of uncontrollable spinning.   Symptoms of positional vertigo happen suddenly when you change the position of your head.   How does it occur?   In the inner part of your ear are 3 semicircular canals. Movement of the fluid in these canals helps your brain maintain your balance and know what position you are in (for example, standing up, lying down, or standing on your head).   Sometimes small crystals of calcium develop and float in the fluid in the inner ear. This can happen after a head injury, with a severe cold, or simply as a part of normal aging. The crystals can cause vertigo when you change head position and they strike against nerve endings in the semicircular canals. Usually the calcium crystals dissolve in a few weeks and stop causing vertigo. However, sometimes the crystals do not dissolve and the vertigo returns from time to time.   What are the symptoms?   A sudden feeling that you are spinning, or that the room is spinning, is the main symptom. You may feel the vertigo when you first wake up. It may seem that any turn of your head brings on brief but intense spells of vertigo. It may happen when you tilt your head, look up or down, or roll over in bed.   You may have nausea and vomiting along with the vertigo. Even if a spell of vertigo is brief, you may have a feeling of queasiness for several minutes or even hours afterward.   How is it diagnosed?   Your healthcare provider will ask about your symptoms and examine you. You may also  be given a [place]-Hallpike position test.   You start the [place]-Hallpike test by sitting upright on the examining table. Your healthcare provider slowly brings your head down over the edge of the table and turns your head to one side. If you have positional vertigo, your provider will see your eyes making fast, jerky movements called nystagmus. If no nystagmus is seen, your provider will repeat the test, this time turning your head to the opposite side, to test the other inner ear. If you then have nystagmus and vertigo, the ear that is pointing toward the floor is the one causing the problem. The nystagmus and vertigo will slow down and stop after 15 to 20 seconds. If you do not move your head, no more symptoms will occur. When you sit back up, you will have vertigo again, but for a shorter time.   Other tests you may have are:   an ear exam   an audiogram to check your hearing   a test of your nerve responses   an electronystagmogram (ENG) test.   How is it treated?   Mild vertigo is often treated with medicine. The most common medicine for this problem is meclizine. It is taken up to 4 times a day for the vertigo and nausea or vomiting. One of the problems with this medicine is that it causes drowsiness. This is not as much of a problem if you have severe vertigo, which usually requires bed rest. Then the medicine can help you sleep and get relief from the vertigo while you sleep.   Your healthcare provider may recommend techniques that use gravity to move the crystals away from the nerve endings into an area of the inner ear that won't cause any problems. These are called repositioning techniques.   One repositioning technique is the Epley maneuver. It can be very helpful. Your healthcare provider will move your head into 4 positions. You will hold each position for about 30 seconds.   Your healthcare provider may also suggest that you do Mackay-Daroff exercises. Your provider may recommend that you do these  exercises 3 times a day for 2 weeks. To do these exercises:   Start by sitting upright on your bed.   Lie on your left side, with your head angled upward about nursing home. (Imagine that you are looking at the head of someone standing about 6 feet in front of you.) Stay in this position for 30 seconds, or, if you are having vertigo, until the vertigo stops.   Return to the sitting position for 30 seconds.   Lie on your right side, and follow the same routine.   Your healthcare provider may refer you to a physical therapist to learn and practice these repositioning techniques.   Rarely, when repositioning techniques don't help and the vertigo has not gone away after a few weeks, severe cases may eventually require surgery.   How long will the effects last?   Even without treatment, positional vertigo usually goes away within several weeks. Sometimes it recurs despite treatment.   How do I take care of myself?   If your vertigo is mild, you may be able to continue your usual activities, especially if you have opportunities to sit and rest when you have vertigo.   If your vertigo does not allow you to continue your usual routine, you should rest at home.   Use medicine as prescribed by your healthcare provider to help stop symptoms of dizziness, nausea, and vomiting.   Follow your instructions for using the repositioning techniques.   Do not try to drive, operate tools or machinery, or do other tasks, even cooking, that could endanger yourself or others if you suddenly become dizzy.   Follow your healthcare provider's recommendations for follow-up visits.   Contact your healthcare provider if:   Your symptoms seem to be getting worse, more frequent, or longer lasting.   You develop new symptoms, such as a loss of hearing or severe headache.     Published by [employer].  This content is reviewed periodically and is subject to change as new health information becomes available. The information is intended to inform and  educate and is not a replacement for medical evaluation, advice, diagnosis or treatment by a healthcare professional.   Developed by [employer].   ? 2010 [employer] and/or its affiliates. All Rights Reserved.   Copyright   [employer] 2011  [employer]ult [employer]

## 2019-01-07 ENCOUNTER — MYC REFILL (OUTPATIENT)
Dept: PEDIATRICS | Facility: CLINIC | Age: 54
End: 2019-01-07

## 2019-01-07 DIAGNOSIS — G58.8 PUDENDAL NEURALGIA: ICD-10-CM

## 2019-01-07 DIAGNOSIS — G89.4 CHRONIC PAIN SYNDROME: ICD-10-CM

## 2019-01-07 RX ORDER — HYDROMORPHONE HYDROCHLORIDE 2 MG/1
2 TABLET ORAL EVERY 4 HOURS PRN
Qty: 60 TABLET | Refills: 0 | Status: SHIPPED | OUTPATIENT
Start: 2019-01-07 | End: 2019-02-04

## 2019-01-21 ENCOUNTER — MYC MEDICAL ADVICE (OUTPATIENT)
Dept: PEDIATRICS | Facility: CLINIC | Age: 54
End: 2019-01-21

## 2019-01-21 ENCOUNTER — MYC REFILL (OUTPATIENT)
Dept: PEDIATRICS | Facility: CLINIC | Age: 54
End: 2019-01-21

## 2019-01-21 DIAGNOSIS — F51.01 PRIMARY INSOMNIA: ICD-10-CM

## 2019-01-21 RX ORDER — ZOLPIDEM TARTRATE 10 MG/1
10 TABLET ORAL
Qty: 30 TABLET | Refills: 5 | Status: SHIPPED | OUTPATIENT
Start: 2019-01-21 | End: 2019-07-16

## 2019-01-21 NOTE — TELEPHONE ENCOUNTER
Duplicate. Already refilled today. Will disregard.    zolpidem (AMBIEN) 10 MG tablet 30 tablet 5 1/21/2019  No   Sig - Route: Take 1 tablet (10 mg) by mouth nightly as needed for sleep - Dami Wong, RN  Triage Nurse

## 2019-02-04 ENCOUNTER — MYC REFILL (OUTPATIENT)
Dept: PEDIATRICS | Facility: CLINIC | Age: 54
End: 2019-02-04

## 2019-02-04 DIAGNOSIS — G58.8 PUDENDAL NEURALGIA: ICD-10-CM

## 2019-02-04 DIAGNOSIS — G89.4 CHRONIC PAIN SYNDROME: ICD-10-CM

## 2019-02-04 RX ORDER — HYDROMORPHONE HYDROCHLORIDE 2 MG/1
2 TABLET ORAL EVERY 4 HOURS PRN
Qty: 60 TABLET | Refills: 0 | Status: SHIPPED | OUTPATIENT
Start: 2019-02-04 | End: 2019-03-04

## 2019-03-01 ENCOUNTER — MYC MEDICAL ADVICE (OUTPATIENT)
Dept: PEDIATRICS | Facility: CLINIC | Age: 54
End: 2019-03-01

## 2019-03-01 ENCOUNTER — MYC REFILL (OUTPATIENT)
Dept: PEDIATRICS | Facility: CLINIC | Age: 54
End: 2019-03-01

## 2019-03-01 DIAGNOSIS — G58.8 PUDENDAL NEURALGIA: ICD-10-CM

## 2019-03-01 DIAGNOSIS — G89.4 CHRONIC PAIN SYNDROME: ICD-10-CM

## 2019-03-01 RX ORDER — HYDROMORPHONE HYDROCHLORIDE 2 MG/1
2 TABLET ORAL EVERY 4 HOURS PRN
Qty: 60 TABLET | Refills: 0 | Status: CANCELLED | OUTPATIENT
Start: 2019-03-01

## 2019-03-01 NOTE — TELEPHONE ENCOUNTER
Can wait till Monday for  to address:    Hydromorphone 2 mg      Last Written Prescription Date:  2/4/19  Last Fill Quantity: 60,   # refills: 0  Last Office Visit: 12/12/18  Future Office visit:       Routing refill request to provider for review/approval because:  Drug not on the FMG, P or [facility] refill protocol or controlled substance    Marvin, RN  Triage Nurse

## 2019-03-04 RX ORDER — HYDROMORPHONE HYDROCHLORIDE 2 MG/1
2 TABLET ORAL EVERY 4 HOURS PRN
Qty: 60 TABLET | Refills: 0 | Status: SHIPPED | OUTPATIENT
Start: 2019-03-04 | End: 2019-03-29

## 2019-03-04 NOTE — TELEPHONE ENCOUNTER
Walked script down to [facility] pharmacy and called pt to  at his preferred pharmacy, [facility].   Janis Oshea MA

## 2019-03-29 ENCOUNTER — MYC REFILL (OUTPATIENT)
Dept: PEDIATRICS | Facility: CLINIC | Age: 54
End: 2019-03-29

## 2019-03-29 DIAGNOSIS — G89.4 CHRONIC PAIN SYNDROME: ICD-10-CM

## 2019-03-29 DIAGNOSIS — G58.8 PUDENDAL NEURALGIA: ICD-10-CM

## 2019-03-29 RX ORDER — HYDROMORPHONE HYDROCHLORIDE 2 MG/1
2 TABLET ORAL EVERY 4 HOURS PRN
Qty: 60 TABLET | Refills: 0 | Status: SHIPPED | OUTPATIENT
Start: 2019-03-29 | End: 2019-04-26

## 2019-04-22 ENCOUNTER — OFFICE VISIT (OUTPATIENT)
Dept: PEDIATRICS | Facility: CLINIC | Age: 54
End: 2019-04-22
Payer: MEDICARE

## 2019-04-22 VITALS
SYSTOLIC BLOOD PRESSURE: 138 MMHG | HEART RATE: 67 BPM | WEIGHT: 157.7 LBS | OXYGEN SATURATION: 97 % | RESPIRATION RATE: 16 BRPM | TEMPERATURE: 98.2 F | BODY MASS INDEX: 23.9 KG/M2 | HEIGHT: 68 IN | DIASTOLIC BLOOD PRESSURE: 82 MMHG

## 2019-04-22 DIAGNOSIS — J31.0 RHINITIS, UNSPECIFIED TYPE: ICD-10-CM

## 2019-04-22 DIAGNOSIS — G89.4 CHRONIC PAIN SYNDROME: ICD-10-CM

## 2019-04-22 DIAGNOSIS — G58.8 PUDENDAL NEURALGIA: ICD-10-CM

## 2019-04-22 DIAGNOSIS — Z12.11 SCREEN FOR COLON CANCER: ICD-10-CM

## 2019-04-22 DIAGNOSIS — J37.0 CHRONIC LARYNGITIS: Primary | ICD-10-CM

## 2019-04-22 PROCEDURE — 80307 DRUG TEST PRSMV CHEM ANLYZR: CPT | Mod: 90 | Performed by: INTERNAL MEDICINE

## 2019-04-22 PROCEDURE — 99213 OFFICE O/P EST LOW 20 MIN: CPT | Performed by: INTERNAL MEDICINE

## 2019-04-22 PROCEDURE — 99000 SPECIMEN HANDLING OFFICE-LAB: CPT | Performed by: INTERNAL MEDICINE

## 2019-04-22 RX ORDER — OMEPRAZOLE 20 MG/1
20 TABLET, DELAYED RELEASE ORAL DAILY
Qty: 30 TABLET | Refills: 2 | Status: SHIPPED | OUTPATIENT
Start: 2019-04-22 | End: 2019-08-20

## 2019-04-22 RX ORDER — FLUTICASONE PROPIONATE 50 MCG
1 SPRAY, SUSPENSION (ML) NASAL DAILY
Qty: 16 G | Refills: 2 | Status: SHIPPED | OUTPATIENT
Start: 2019-04-22 | End: 2023-03-27

## 2019-04-22 ASSESSMENT — MIFFLIN-ST. JEOR: SCORE: 1515.85

## 2019-04-22 NOTE — PROGRESS NOTES
"  SUBJECTIVE:   Taco Rooney is a 54 year old male who presents to clinic today for the following   health issues:    Laryngitis     Duration: several years     Description (location/character/radiation): vocal cord    Intensity:  Moderate to severe     Accompanying signs and symptoms: raspy,hoarse voice, burning sensation, loss of voice     History (similar episodes/previous evaluation): None    Precipitating or alleviating factors: while speaking for over a 7 minutes will have these symptoms     Therapies tried and outcome: hot cocoa will help soothe the throat      In the past has been treated for MORALES.  Not taking any meds for sx.  Notes burning sensation frequently in the upper chest, midline.    Also has rhinitis sx. Worsening over the past few weeks.  Taking an antihistamine, not using steroid spray.     Chronic pudendal pain. Treating w/ hydromorphone, approx 2 tabs per day average.  Is due for UTox screen, routine.     OBJECTIVE:     /82 (BP Location: Right arm, Cuff Size: Adult Regular)   Pulse 67   Temp 98.2  F (36.8  C) (Oral)   Resp 16   Ht 1.721 m (5' 7.75\")   Wt 71.5 kg (157 lb 11.2 oz)   SpO2 97%   BMI 24.16 kg/m    Body mass index is 24.16 kg/m .  GEN: no distress  SKIN: no rashes  HEENT: PERRL. Nasal mucosa w/ mild edema. OP moist.  NECK: Supple      ASSESSMENT/PLAN:       ICD-10-CM    1. Chronic laryngitis J37.0 omeprazole (PRILOSEC OTC) 20 MG EC tablet     OTOLARYNGOLOGY REFERRAL   2. Screen for colon cancer Z12.11 Fecal colorectal cancer screen (FIT)   3. Rhinitis, unspecified type J31.0 fluticasone (FLONASE) 50 MCG/ACT nasal spray   4. 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 Chronic pain syndrome G89.4 Drug  Screen Comprehensive , Urine with Reported Meds (MedTox) (Pain Care Package)     Chronic laryngitis - likely related to chronic MORALES, potentially AR as well.    Patient Instructions   Trial of Omeprazole 20 " mg once per day (Prilosec) - reduces stomach acid   Works best if taken 30 minutes prior to eating    Try the omeprazole for about 1 month     If your vocal cord symptoms go away, you can try stopping. Restart if your symptoms return   If your symptoms do not go away, call to set up an ENT visit    Also add Flonase (fluticasone) nasal spray   1-2 sprays into each nostril once per day    Check labwork today   I will contact you with the results      Eriberto Strange MD  [facility]

## 2019-04-22 NOTE — PATIENT INSTRUCTIONS
Trial of Omeprazole 20 mg once per day (Prilosec) - reduces stomach acid   Works best if taken 30 minutes prior to eating    Try the omeprazole for about 1 month     If your vocal cord symptoms go away, you can try stopping. Restart if your symptoms return   If your symptoms do not go away, call to set up an ENT visit    Also add Flonase (fluticasone) nasal spray   1-2 sprays into each nostril once per day    Check labwork today   I will contact you with the results

## 2019-04-26 ENCOUNTER — MYC REFILL (OUTPATIENT)
Dept: PEDIATRICS | Facility: CLINIC | Age: 54
End: 2019-04-26

## 2019-04-26 DIAGNOSIS — G89.4 CHRONIC PAIN SYNDROME: ICD-10-CM

## 2019-04-26 DIAGNOSIS — G58.8 PUDENDAL NEURALGIA: ICD-10-CM

## 2019-04-26 RX ORDER — HYDROMORPHONE HYDROCHLORIDE 2 MG/1
2 TABLET ORAL EVERY 4 HOURS PRN
Qty: 60 TABLET | Refills: 0 | Status: SHIPPED | OUTPATIENT
Start: 2019-04-26 | End: 2019-05-24

## 2019-04-27 LAB — PAIN DRUG SCR UR W RPTD MEDS: NORMAL

## 2019-05-24 ENCOUNTER — MYC REFILL (OUTPATIENT)
Dept: PEDIATRICS | Facility: CLINIC | Age: 54
End: 2019-05-24

## 2019-05-24 DIAGNOSIS — G58.8 PUDENDAL NEURALGIA: ICD-10-CM

## 2019-05-24 DIAGNOSIS — G89.4 CHRONIC PAIN SYNDROME: ICD-10-CM

## 2019-05-24 NOTE — TELEPHONE ENCOUNTER
Requested Prescriptions   Pending Prescriptions Disp Refills     HYDROmorphone (DILAUDID) 2 MG tablet        Last Written Prescription Date:  4/26/2019  Last Fill Quantity: 60,   # refills: 0  Last Office Visit: 4/22/2019  Future Office visit:       Routing refill request to provider for review/approval because:  Drug not on the [facility], P or [facility] refill protocol or controlled substance   60 tablet 0     Sig: Take 1 tablet (2 mg) by mouth every 4 hours as needed for pain       There is no refill protocol information for this order

## 2019-05-28 ENCOUNTER — MYC REFILL (OUTPATIENT)
Dept: PEDIATRICS | Facility: CLINIC | Age: 54
End: 2019-05-28

## 2019-05-28 DIAGNOSIS — G89.4 CHRONIC PAIN SYNDROME: ICD-10-CM

## 2019-05-28 DIAGNOSIS — G58.8 PUDENDAL NEURALGIA: ICD-10-CM

## 2019-05-28 RX ORDER — HYDROMORPHONE HYDROCHLORIDE 2 MG/1
2 TABLET ORAL EVERY 4 HOURS PRN
Qty: 60 TABLET | Refills: 0 | Status: SHIPPED | OUTPATIENT
Start: 2019-05-28 | End: 2019-06-26

## 2019-05-28 RX ORDER — HYDROMORPHONE HYDROCHLORIDE 2 MG/1
2 TABLET ORAL EVERY 4 HOURS PRN
Qty: 60 TABLET | Refills: 0 | Status: CANCELLED | OUTPATIENT
Start: 2019-05-28

## 2019-05-28 NOTE — TELEPHONE ENCOUNTER
Requested Prescriptions   Pending Prescriptions Disp Refills     HYDROmorphone (DILAUDID) 2 MG tablet        Last Written Prescription Date:  4/26/2019  Last Fill Quantity: 60,   # refills: 0  Last Office Visit: 4/22/2019  Future Office visit:       Routing refill request to provider for review/approval because:  Drug not on the [facility], P or [facility] refill protocol or controlled substance   60 tablet 0     Sig: Take 1 tablet (2 mg) by mouth every 4 hours as needed for pain       There is no refill protocol information for this order

## 2019-06-26 ENCOUNTER — MYC REFILL (OUTPATIENT)
Dept: PEDIATRICS | Facility: CLINIC | Age: 54
End: 2019-06-26

## 2019-06-26 DIAGNOSIS — G89.4 CHRONIC PAIN SYNDROME: ICD-10-CM

## 2019-06-26 DIAGNOSIS — G58.8 PUDENDAL NEURALGIA: ICD-10-CM

## 2019-06-26 RX ORDER — HYDROMORPHONE HYDROCHLORIDE 2 MG/1
2 TABLET ORAL EVERY 4 HOURS PRN
Qty: 60 TABLET | Refills: 0 | Status: SHIPPED | OUTPATIENT
Start: 2019-06-26 | End: 2019-07-26

## 2019-07-09 ENCOUNTER — ALLIED HEALTH/NURSE VISIT (OUTPATIENT)
Dept: PEDIATRICS | Facility: CLINIC | Age: 54
End: 2019-07-09
Payer: MEDICARE

## 2019-07-09 VITALS — DIASTOLIC BLOOD PRESSURE: 86 MMHG | SYSTOLIC BLOOD PRESSURE: 138 MMHG

## 2019-07-09 DIAGNOSIS — Z01.30 BP CHECK: Primary | ICD-10-CM

## 2019-07-09 PROCEDURE — 99207 ZZC NO CHARGE NURSE ONLY: CPT | Performed by: INTERNAL MEDICINE

## 2019-07-09 NOTE — PROGRESS NOTES
Taco Rooney was evaluated at [facility] on July 9, 2019 at which time his blood pressure was:    BP Readings from Last 3 Encounters:   07/09/19 138/86   04/22/19 138/82   12/12/18 134/76     Pulse Readings from Last 3 Encounters:   04/22/19 67   12/12/18 70   02/13/18 92       Reviewed lifestyle modifications for blood pressure control and reduction: including making healthy food choices, managing weight, getting regular exercise, smoking cessation, reducing alcohol consumption, monitoring blood pressure regularly.     Symptoms: None    BP Goal:< 140/90 mmHg    BP Assessment:  BP at goal    Potential Reasons for BP too high: NA - Not applicable    BP Follow-Up Plan: Recheck BP in 6 months at pharmacy    Recommendation to Provider: none    Note completed by: Thank You~  Jeimy Barkley, PharmD,   [facility], [place]  [phone]

## 2019-07-16 ENCOUNTER — MYC REFILL (OUTPATIENT)
Dept: PEDIATRICS | Facility: CLINIC | Age: 54
End: 2019-07-16

## 2019-07-16 DIAGNOSIS — E78.5 HYPERLIPIDEMIA WITH TARGET LDL LESS THAN 130: ICD-10-CM

## 2019-07-16 DIAGNOSIS — F51.01 PRIMARY INSOMNIA: ICD-10-CM

## 2019-07-16 RX ORDER — ZOLPIDEM TARTRATE 10 MG/1
10 TABLET ORAL
Qty: 90 TABLET | Refills: 1 | Status: SHIPPED | OUTPATIENT
Start: 2019-07-16 | End: 2020-01-10

## 2019-07-16 RX ORDER — SIMVASTATIN 20 MG
TABLET ORAL
Qty: 90 TABLET | Refills: 1 | Status: SHIPPED | OUTPATIENT
Start: 2019-07-16 | End: 2020-02-12

## 2019-07-16 NOTE — TELEPHONE ENCOUNTER
Routing refill request to provider for review/approval because:  Labs not current:  LDL    Ambien- not on drug protocol for RN.     Naomi Sanchez RN Flex

## 2019-07-16 NOTE — TELEPHONE ENCOUNTER
"Requested Prescriptions   Pending Prescriptions Disp Refills     simvastatin (ZOCOR) 20 MG tablet 90 tablet 1     Sig: TAKE ONE TABLET BY MOUTH EVERY NIGHT AT BEDTIME  Last Written Prescription Date:  12/12/2018  Last Fill Quantity: 90 tablet,  # refills: 1   Last office visit: 4/22/2019 with prescribing provider:     Eriberto Strange MD        Future Office Visit:           Statins Protocol Failed - 7/16/2019 11:30 AM        Failed - LDL on file in past 12 months     Recent Labs   Lab Test 01/14/16  0713   LDL 69             Passed - No abnormal creatine kinase in past 12 months     No lab results found.             Passed - Recent (12 mo) or future (30 days) visit within the authorizing provider's specialty     Patient had office visit in the last 12 months or has a visit in the next 30 days with authorizing provider or within the authorizing provider's specialty.  See \"Patient Info\" tab in inbasket, or \"Choose Columns\" in Meds & Orders section of the refill encounter.              Passed - Medication is active on med list        Passed - Patient is age 18 or older        zolpidem (AMBIEN) 10 MG tablet 30 tablet 5     Sig: Take 1 tablet (10 mg) by mouth nightly as needed for sleep       There is no refill protocol information for this order      Last Written Prescription Date:  01/21/2019  Last Fill Quantity: 30 tablet,  # refills: 5   Last office visit: 4/22/2019 with prescribing provider:     Eriberto Strange MD        Future Office Visit:      Routing refill request to provider for review/approval because:  Drug not on the G, P or  Health refill protocol or controlled substance      "

## 2019-07-23 NOTE — TELEPHONE ENCOUNTER
Pt will call us back to schedule appt. Around his grandma's schedule(he is the main caregiver for) and an opening in his...  Janis Oshea MA

## 2019-07-26 ENCOUNTER — MYC REFILL (OUTPATIENT)
Dept: PEDIATRICS | Facility: CLINIC | Age: 54
End: 2019-07-26

## 2019-07-26 DIAGNOSIS — G58.8 PUDENDAL NEURALGIA: ICD-10-CM

## 2019-07-26 DIAGNOSIS — G89.4 CHRONIC PAIN SYNDROME: ICD-10-CM

## 2019-07-26 NOTE — TELEPHONE ENCOUNTER
Requested Prescriptions   Pending Prescriptions Disp Refills     HYDROmorphone (DILAUDID) 2 MG tablet        Last Written Prescription Date:  6/26/2019  Last Fill Quantity: 60,   # refills: 0  Last Office Visit: 4/22/2019  Future Office visit:       Routing refill request to provider for review/approval because:  Drug not on the [facility], P or [facility] refill protocol or controlled substance   60 tablet 0     Sig: Take 1 tablet (2 mg) by mouth every 4 hours as needed for pain       There is no refill protocol information for this order

## 2019-07-29 RX ORDER — HYDROMORPHONE HYDROCHLORIDE 2 MG/1
2 TABLET ORAL EVERY 4 HOURS PRN
Qty: 60 TABLET | Refills: 0 | Status: SHIPPED | OUTPATIENT
Start: 2019-07-29 | End: 2019-08-28

## 2019-08-20 ENCOUNTER — MYC REFILL (OUTPATIENT)
Dept: PEDIATRICS | Facility: CLINIC | Age: 54
End: 2019-08-20

## 2019-08-20 DIAGNOSIS — J37.0 CHRONIC LARYNGITIS: ICD-10-CM

## 2019-08-20 RX ORDER — OMEPRAZOLE 20 MG/1
20 TABLET, DELAYED RELEASE ORAL DAILY
Qty: 30 TABLET | Refills: 11 | Status: SHIPPED | OUTPATIENT
Start: 2019-08-20 | End: 2020-10-09

## 2019-08-28 ENCOUNTER — MYC REFILL (OUTPATIENT)
Dept: PEDIATRICS | Facility: CLINIC | Age: 54
End: 2019-08-28

## 2019-08-28 DIAGNOSIS — G89.4 CHRONIC PAIN SYNDROME: ICD-10-CM

## 2019-08-28 DIAGNOSIS — G58.8 PUDENDAL NEURALGIA: ICD-10-CM

## 2019-08-29 RX ORDER — HYDROMORPHONE HYDROCHLORIDE 2 MG/1
2 TABLET ORAL EVERY 4 HOURS PRN
Qty: 60 TABLET | Refills: 0 | Status: SHIPPED | OUTPATIENT
Start: 2019-08-29 | End: 2019-10-01

## 2019-08-30 ENCOUNTER — TELEPHONE (OUTPATIENT)
Dept: PEDIATRICS | Facility: CLINIC | Age: 54
End: 2019-08-30

## 2019-08-30 NOTE — LETTER
August 30, 2019      Taco Rooney  [address]  [place] [address]        Dear Taco,       We care about your health and have reviewed your health plan including your medical conditions, medications, and lab results.  Based on this review, it is recommended that you follow up regarding the following health topic(s):  -Colon Cancer Screening    We recommend you take the following action(s):  -schedule a COLONOSCOPY to look for colon cancer (due every 10 years or 5 years in higher risk situations.)  Colonoscopies can prevent 90-95% of colon cancer deaths.  Problem lesions can be removed before they ever become cancer.  If you do not wish to do a colonoscopy or cannot afford to do one at this time, there is another option called a Fecal Immunochemical Occult Blood Test (FIT) a take home stool sample kit.  It does not replace the colonoscopy for colorectal cancer screening, but it can detect hidden bleeding in the lower colon.  It does need to be repeated every year and if a positive result is obtained, you would be referred for a colonoscopy.  If you have completed either one of these tests at another facility, please have the records sent to our clinic for our records.     Please call us at the [facility] - [phone] (or use [facility]) to address the above recommendations.     Thank you for trusting [facility] and we appreciate the opportunity to serve you.  We look forward to supporting your healthcare needs in the future.    Healthy Regards,    Your Health Care Team  [facility]

## 2019-08-30 NOTE — TELEPHONE ENCOUNTER
Panel Management Review        Composite cancer screening  Chart review shows that this patient is due/due soon for the following Fecal Colorectal (FIT)  Summary:    Patient is due/failing the following:   FIT    Action needed:   Patient needs referral/order: FIT    Type of outreach:    Sent letter.    Questions for provider review:    None                                                                                                                                    Gladis Conway MA

## 2019-10-01 ENCOUNTER — MYC REFILL (OUTPATIENT)
Dept: PEDIATRICS | Facility: CLINIC | Age: 54
End: 2019-10-01

## 2019-10-01 DIAGNOSIS — G89.4 CHRONIC PAIN SYNDROME: ICD-10-CM

## 2019-10-01 DIAGNOSIS — G58.8 PUDENDAL NEURALGIA: ICD-10-CM

## 2019-10-02 ENCOUNTER — HEALTH MAINTENANCE LETTER (OUTPATIENT)
Age: 54
End: 2019-10-02

## 2019-10-02 RX ORDER — HYDROMORPHONE HYDROCHLORIDE 2 MG/1
2 TABLET ORAL EVERY 4 HOURS PRN
Qty: 60 TABLET | Refills: 0 | Status: SHIPPED | OUTPATIENT
Start: 2019-10-02 | End: 2019-10-30

## 2019-10-02 NOTE — TELEPHONE ENCOUNTER
Hydromorphone 2mg      Last Written Prescription Date:  8/29/19  Last Fill Quantity: 60,   # refills: 0  Last Office Visit: 4/22/19 w/ PCP  Future Office visit:       Routing refill request to provider for review/approval because:  Drug not on the [facility], P or [facility] refill protocol or controlled substance     checked, last filled:   8/29/19 #60  7/29/19 #60  6/26/19 #60    Amirah LAUREN RN

## 2019-10-30 DIAGNOSIS — G58.8 PUDENDAL NEURALGIA: ICD-10-CM

## 2019-10-30 DIAGNOSIS — G89.4 CHRONIC PAIN SYNDROME: ICD-10-CM

## 2019-10-30 DIAGNOSIS — E78.5 HYPERLIPIDEMIA WITH TARGET LDL LESS THAN 130: ICD-10-CM

## 2019-10-30 RX ORDER — HYDROMORPHONE HYDROCHLORIDE 2 MG/1
2 TABLET ORAL EVERY 4 HOURS PRN
Qty: 60 TABLET | Refills: 0 | Status: SHIPPED | OUTPATIENT
Start: 2019-10-30 | End: 2019-12-10

## 2019-10-30 NOTE — TELEPHONE ENCOUNTER
Pending Prescriptions:                       Disp   Refills    HYDROmorphone (DILAUDID) 2 MG tablet      60 tab*0            Sig: Take 1 tablet (2 mg) by mouth every 4 hours as           needed for pain    Pt is not quite ready for this medication yet, but wanted to submit at this time as he is leaving to go out of town on 11/6/19 and would like to  on 11/5/19 please.

## 2019-12-10 ENCOUNTER — MYC REFILL (OUTPATIENT)
Dept: PEDIATRICS | Facility: CLINIC | Age: 54
End: 2019-12-10

## 2019-12-10 DIAGNOSIS — G58.8 PUDENDAL NEURALGIA: ICD-10-CM

## 2019-12-10 DIAGNOSIS — G89.4 CHRONIC PAIN SYNDROME: ICD-10-CM

## 2019-12-11 RX ORDER — HYDROMORPHONE HYDROCHLORIDE 2 MG/1
2 TABLET ORAL EVERY 4 HOURS PRN
Qty: 60 TABLET | Refills: 0 | Status: SHIPPED | OUTPATIENT
Start: 2019-12-11 | End: 2020-01-20

## 2019-12-16 ENCOUNTER — HEALTH MAINTENANCE LETTER (OUTPATIENT)
Age: 54
End: 2019-12-16

## 2020-01-10 ENCOUNTER — MYC REFILL (OUTPATIENT)
Dept: PEDIATRICS | Facility: CLINIC | Age: 55
End: 2020-01-10

## 2020-01-10 DIAGNOSIS — F51.01 PRIMARY INSOMNIA: ICD-10-CM

## 2020-01-10 RX ORDER — ZOLPIDEM TARTRATE 10 MG/1
10 TABLET ORAL
Qty: 90 TABLET | Refills: 1 | Status: SHIPPED | OUTPATIENT
Start: 2020-01-10 | End: 2020-07-08

## 2020-01-10 RX ORDER — ZOLPIDEM TARTRATE 10 MG/1
TABLET ORAL
Qty: 90 TABLET | Refills: 1 | OUTPATIENT
Start: 2020-01-10

## 2020-01-20 ENCOUNTER — MYC REFILL (OUTPATIENT)
Dept: PEDIATRICS | Facility: CLINIC | Age: 55
End: 2020-01-20

## 2020-01-20 DIAGNOSIS — G89.4 CHRONIC PAIN SYNDROME: ICD-10-CM

## 2020-01-20 DIAGNOSIS — G58.8 PUDENDAL NEURALGIA: ICD-10-CM

## 2020-01-21 RX ORDER — HYDROMORPHONE HYDROCHLORIDE 2 MG/1
2 TABLET ORAL EVERY 4 HOURS PRN
Qty: 60 TABLET | Refills: 0 | Status: SHIPPED | OUTPATIENT
Start: 2020-01-21 | End: 2020-05-18

## 2020-02-11 ENCOUNTER — TELEPHONE (OUTPATIENT)
Dept: PEDIATRICS | Facility: CLINIC | Age: 55
End: 2020-02-11

## 2020-02-11 NOTE — TELEPHONE ENCOUNTER
"Pre-Visit Planning     Future Appointments   Date Time Provider Department Center   2/12/2020 10:20 AM Eriberto Strange MD [facility]     Arrival Time for this Appointment:    Appointment Notes for this encounter:   Data Unavailable    Questionnaires Reviewed/Assigned  No additional questionnaires are needed        Patient preferred phone number: [phone]    Spoke to patient via phone. Patient does not have additional questions or concerns.        Visit is not preventive.    Health Maintenance Due   Topic Date Due     ANNUAL REVIEW OF HM ORDERS  1965     ADVANCE CARE PLANNING  1965     FIT  04/08/1973     HIV SCREENING  04/08/1978     ZOSTER IMMUNIZATION (1 of 2) 04/08/2013     MEDICARE ANNUAL WELLNESS VISIT  01/13/2015     DTAP/TDAP/TD IMMUNIZATION (2 - Td) 09/26/2015     INFLUENZA VACCINE (1) 09/01/2019     PHQ-2  01/01/2020     Patient is due for:  No appointment needed.    [employer]  Patient is active on [employer].    Questionnaire Review   Offered information on completing questionnaires via [employer].    Call Summary  \"Thank you for your time today.  If anything comes up before your appointment, please feel free to contact us at [phone].\"    "

## 2020-02-12 ENCOUNTER — OFFICE VISIT (OUTPATIENT)
Dept: PEDIATRICS | Facility: CLINIC | Age: 55
End: 2020-02-12
Payer: MEDICARE

## 2020-02-12 VITALS
SYSTOLIC BLOOD PRESSURE: 136 MMHG | HEART RATE: 67 BPM | RESPIRATION RATE: 18 BRPM | WEIGHT: 158 LBS | BODY MASS INDEX: 24.2 KG/M2 | OXYGEN SATURATION: 97 % | DIASTOLIC BLOOD PRESSURE: 80 MMHG | TEMPERATURE: 97.7 F

## 2020-02-12 DIAGNOSIS — F41.1 GENERALIZED ANXIETY DISORDER: ICD-10-CM

## 2020-02-12 DIAGNOSIS — G58.8 PUDENDAL NEURALGIA: Primary | ICD-10-CM

## 2020-02-12 DIAGNOSIS — E78.5 HYPERLIPIDEMIA WITH TARGET LDL LESS THAN 130: ICD-10-CM

## 2020-02-12 DIAGNOSIS — J37.0 CHRONIC LARYNGITIS: ICD-10-CM

## 2020-02-12 PROCEDURE — 99214 OFFICE O/P EST MOD 30 MIN: CPT | Performed by: INTERNAL MEDICINE

## 2020-02-12 RX ORDER — HYDROCODONE BITARTRATE AND ACETAMINOPHEN 10; 325 MG/1; MG/1
.5-1 TABLET ORAL EVERY 8 HOURS PRN
Qty: 60 TABLET | Refills: 0 | Status: SHIPPED | OUTPATIENT
Start: 2020-02-12 | End: 2020-03-16

## 2020-02-12 RX ORDER — LORAZEPAM 0.5 MG/1
TABLET ORAL
Qty: 10 TABLET | Refills: 1 | Status: SHIPPED | OUTPATIENT
Start: 2020-02-12

## 2020-02-12 RX ORDER — SIMVASTATIN 20 MG
TABLET ORAL
Qty: 90 TABLET | Refills: 1 | Status: SHIPPED | OUTPATIENT
Start: 2020-02-12 | End: 2020-10-09

## 2020-02-12 NOTE — PROGRESS NOTES
Subjective     Taco Rooney is a 54 year old male who presents to clinic today for the following health issues:    HPI   Multiple issues to review today.    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  He has been weaning hydromorphone over the past few months. Rx have been filled every approx 40 days rather than  30. In the past he has used hydrocodone. He would like to try changing back then weaning that use.   Pain is essentially unchanged.    Dupuytren's contracture right 5th finger. Had surgery in the past.  5th finger is worsening.    Fell a few days ago. Medial right wrist sprain.    Anxiety. Having significant stressors. 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 She just got enrolled in hospice, so he is now getting assistance.  Has flares of anxiety at times. Substernal chest pain.  No exertional pain. Has been followed by cardiology.  He has used lorazepam in the past for  Severe flares of anxiety.  Reviewed risks of benzodiazepine w/ opiates.     Hyperlipidemia. Treated w/ statin.   Overdue for FLP.    Chronic laryngitis. Potentially related to GERD.  Has been taking Prilosec. Seems to be helping.  Currently using OTC, not the rx sent last fall.       Reviewed and updated as needed this visit by Provider  Tobacco  Allergies  Meds  Problems  Med Hx  Surg Hx  Fam Hx         Review of Systems   ROS COMP: Constitutional, HEENT, cardiovascular, pulmonary, gi and gu systems are negative, except as otherwise noted.      Objective    /80   Pulse 67   Temp 97.7  F (36.5  C) (Tympanic)   Resp 18   Wt 71.7 kg (158 lb)   SpO2 97%   BMI 24.20 kg/m    Body mass index is 24.2 kg/m .  Physical Exam   GEN: No distress  HEENT: PERRL. No nasal discharge. OP moist.  NECK: Supple.  LUNGS: Clear to auscultation bilaterally. No rhonchi, rales, wheezes or retractions.  CV: Regular rate and rhythm.  No murmurs, rubs or gallops. Pulses 2+ radial.  ABD: Bowel sounds  positive throughout. Soft, nontender, nondistended. No organomegaly. No masses.  EXTR: flexion contracture of the right 5th finger. Ecchymosis over the medial right wrist.  PSYCH: Normal affect. Well groomed. Good eye contact.          Assessment & Plan       ICD-10-CM    1. 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 GENERALIZED ANXIETY DIS F41.1 LORazepam (ATIVAN) 0.5 MG tablet   3. Hyperlipidemia with target LDL less than 130 E78.5 simvastatin (ZOCOR) 20 MG tablet     Comprehensive metabolic panel     Lipid panel reflex to direct LDL Fasting   4. Chronic laryngitis J37.0      Patient Instructions   Change from hydromorphone to Hydrocodone   1/2 - 1 tablet twice per day as needed    Lorazepam - take sparingly as needed for anxiety management    Take omeprazole (Prilosec) daily    Fasting labwork next week      Eriberto Strange MD  [facility]

## 2020-02-12 NOTE — PATIENT INSTRUCTIONS
Change from hydromorphone to Hydrocodone   1/2 - 1 tablet twice per day as needed    Lorazepam - take sparingly as needed for anxiety management    Take omeprazole (Prilosec) daily    Fasting labwork next week

## 2020-03-16 ENCOUNTER — MYC REFILL (OUTPATIENT)
Dept: PEDIATRICS | Facility: CLINIC | Age: 55
End: 2020-03-16

## 2020-03-16 DIAGNOSIS — G58.8 PUDENDAL NEURALGIA: ICD-10-CM

## 2020-03-16 RX ORDER — HYDROCODONE BITARTRATE AND ACETAMINOPHEN 10; 325 MG/1; MG/1
.5-1 TABLET ORAL EVERY 8 HOURS PRN
Qty: 60 TABLET | Refills: 0 | Status: SHIPPED | OUTPATIENT
Start: 2020-03-16 | End: 2020-04-16

## 2020-04-16 ENCOUNTER — MYC REFILL (OUTPATIENT)
Dept: PEDIATRICS | Facility: CLINIC | Age: 55
End: 2020-04-16

## 2020-04-16 DIAGNOSIS — G58.8 PUDENDAL NEURALGIA: ICD-10-CM

## 2020-04-16 RX ORDER — HYDROCODONE BITARTRATE AND ACETAMINOPHEN 10; 325 MG/1; MG/1
.5-1 TABLET ORAL EVERY 8 HOURS PRN
Qty: 60 TABLET | Refills: 0 | Status: SHIPPED | OUTPATIENT
Start: 2020-04-16 | End: 2020-05-18

## 2020-05-01 ENCOUNTER — NURSE TRIAGE (OUTPATIENT)
Dept: PEDIATRICS | Facility: CLINIC | Age: 55
End: 2020-05-01

## 2020-05-01 ENCOUNTER — OFFICE VISIT (OUTPATIENT)
Dept: URGENT CARE | Facility: URGENT CARE | Age: 55
End: 2020-05-01
Payer: MEDICARE

## 2020-05-01 VITALS
BODY MASS INDEX: 24.2 KG/M2 | DIASTOLIC BLOOD PRESSURE: 104 MMHG | WEIGHT: 158 LBS | HEART RATE: 72 BPM | SYSTOLIC BLOOD PRESSURE: 171 MMHG | TEMPERATURE: 98.8 F | OXYGEN SATURATION: 98 %

## 2020-05-01 DIAGNOSIS — S91.332A PUNCTURE WOUND OF LEFT FOOT, INITIAL ENCOUNTER: Primary | ICD-10-CM

## 2020-05-01 PROCEDURE — 99213 OFFICE O/P EST LOW 20 MIN: CPT | Performed by: NURSE PRACTITIONER

## 2020-05-01 NOTE — PROGRESS NOTES
"SUBJECTIVE:     Chief Complaint   Patient presents with     Urgent Care     Laceration     knife slid onto left foot this am. Last tdap 2005     Taco Rooney is a 55 year old male who presents to the clinic with a laceration on the left foot sustained 10 hour(s) ago.  This is a non-work related and accidental injury.    Mechanism of injury: States he was moving some papers off the counter and a knife fell off the counter and \"bounced\" off his foot.     Associated symptoms: Denies numbness, weakness, or loss of function. Does have some throbbing pain in the foot.   Last tetanus booster within 10 years: No    EXAM:   The patient appears today in alert,no apparent distress distress  VITALS: BP (!) 171/104   Pulse 72   Temp 98.8  F (37.1  C) (Tympanic)   Wt 71.7 kg (158 lb)   SpO2 98%   BMI 24.20 kg/m    Small puncture site noted to the top of left foot. Mild bruising. Mild tenderness to palpation. Full ROM of foot. Sensation intact. Cap refill < 3 seconds.     Assessment:  Puncture wound of left foot, initial encounter    PLAN:  Discussed with patient that given puncture should heal on its own. Recommend to cleanse daily with soap and water. Apply bacitracin daily. Continue with pain medication as prescribed. Can do ice to help with pain and swelling. Discussed signs of infection and when to seek further care. Did advise to update tetanus today. Patient declines states he felt that he has had a reaction to tetanus in the past, states reaction was fever and body aches. Did discussed normal immune reaction to vaccines v anaphylaxis reactions. Patient continues to decline TDAP. Education was added to AVS. Patient was agreeable to plan and verbalized understanding.   "

## 2020-05-01 NOTE — PATIENT INSTRUCTIONS
Apply bacitracin daily  Cleanse daily with soap and water  Can do current pain regimen to control pain  Can do ice to help with pain and swelling.     Watch for signs of infection: increased redness, swelling, discharge, or fevers.

## 2020-05-01 NOTE — TELEPHONE ENCOUNTER
"    Additional Information    No tetanus booster in > 5 years    Answer Assessment - Initial Assessment Questions  1. LOCATION: \"Where is the puncture located?\"       Top of the left foot.   2. OBJECT: \"What was the object that punctured the skin?\"       Clean fishing knife, Patient uses it as a letter opener.   3. DEPTH: \"How deep do you think the puncture goes?\"       Unknown  4. ONSET: \"When did the injury occur?\" (Minutes or hours)      5am this morning  5. PAIN: \"Is it painful?\" If so, ask: \"How bad is the pain?\"  (Scale 1-10; or mild, moderate, severe)      Throbbing sensation, 3  6. TETANUS: \"When was the last tetanus booster?\"      Past due for a tetanus shot.   7. PREGNANCY: \"Is there any chance you are pregnant?\" \"When was your last menstrual period?\"      N/A    Protocols used: PUNCTURE WOUND-A-OH      "

## 2020-05-11 ENCOUNTER — NURSE TRIAGE (OUTPATIENT)
Dept: NURSING | Facility: CLINIC | Age: 55
End: 2020-05-11

## 2020-05-11 NOTE — TELEPHONE ENCOUNTER
Taco is calling regarding a foot wound from 5/1.    He has developed a small area of redness below the healed wound.    There is no increased tenderness or warmth.  No fever    Advised to continue to monitor site. Call back for increased tenderness, warmth or fever.    Taco reports that he will be near the [place] urgent care and he is going to ask if someone there will look at the site.    He is requesting antibiotics.    Jade Serrano RN  [facility]      Reason for Disposition    [1] Follow-up call to recent contact AND [2] information only call, no triage required    Protocols used: INFORMATION ONLY CALL-A-

## 2020-05-18 ENCOUNTER — MYC REFILL (OUTPATIENT)
Dept: PEDIATRICS | Facility: CLINIC | Age: 55
End: 2020-05-18

## 2020-05-18 ENCOUNTER — OFFICE VISIT (OUTPATIENT)
Dept: URGENT CARE | Facility: URGENT CARE | Age: 55
End: 2020-05-18
Payer: MEDICARE

## 2020-05-18 ENCOUNTER — ANCILLARY PROCEDURE (OUTPATIENT)
Dept: GENERAL RADIOLOGY | Facility: CLINIC | Age: 55
End: 2020-05-18
Attending: PHYSICIAN ASSISTANT
Payer: MEDICARE

## 2020-05-18 VITALS
DIASTOLIC BLOOD PRESSURE: 111 MMHG | BODY MASS INDEX: 24.2 KG/M2 | TEMPERATURE: 99.1 F | SYSTOLIC BLOOD PRESSURE: 157 MMHG | OXYGEN SATURATION: 96 % | WEIGHT: 158 LBS | HEART RATE: 91 BPM

## 2020-05-18 DIAGNOSIS — M25.469 REDNESS AND SWELLING OF KNEE: ICD-10-CM

## 2020-05-18 DIAGNOSIS — R23.8 REDNESS AND SWELLING OF KNEE: ICD-10-CM

## 2020-05-18 DIAGNOSIS — M70.52 INFRAPATELLAR BURSITIS OF LEFT KNEE: ICD-10-CM

## 2020-05-18 DIAGNOSIS — G58.8 PUDENDAL NEURALGIA: ICD-10-CM

## 2020-05-18 DIAGNOSIS — L03.116 CELLULITIS OF KNEE, LEFT: Primary | ICD-10-CM

## 2020-05-18 LAB
BASOPHILS # BLD AUTO: 0 10E9/L (ref 0–0.2)
BASOPHILS NFR BLD AUTO: 0.1 %
DIFFERENTIAL METHOD BLD: ABNORMAL
EOSINOPHIL # BLD AUTO: 0 10E9/L (ref 0–0.7)
EOSINOPHIL NFR BLD AUTO: 0.3 %
ERYTHROCYTE [SEDIMENTATION RATE] IN BLOOD BY WESTERGREN METHOD: 4 MM/H (ref 0–20)
LYMPHOCYTES # BLD AUTO: 0.6 10E9/L (ref 0.8–5.3)
LYMPHOCYTES NFR BLD AUTO: 6.9 %
MONOCYTES # BLD AUTO: 0.7 10E9/L (ref 0–1.3)
MONOCYTES NFR BLD AUTO: 8 %
NEUTROPHILS # BLD AUTO: 7.6 10E9/L (ref 1.6–8.3)
NEUTROPHILS NFR BLD AUTO: 84.7 %
WBC # BLD AUTO: 9 10E9/L (ref 4–11)

## 2020-05-18 PROCEDURE — 85652 RBC SED RATE AUTOMATED: CPT | Performed by: PHYSICIAN ASSISTANT

## 2020-05-18 PROCEDURE — 36415 COLL VENOUS BLD VENIPUNCTURE: CPT | Performed by: PHYSICIAN ASSISTANT

## 2020-05-18 PROCEDURE — 99214 OFFICE O/P EST MOD 30 MIN: CPT | Performed by: PHYSICIAN ASSISTANT

## 2020-05-18 PROCEDURE — 73562 X-RAY EXAM OF KNEE 3: CPT | Mod: LT

## 2020-05-18 PROCEDURE — 84550 ASSAY OF BLOOD/URIC ACID: CPT | Performed by: PHYSICIAN ASSISTANT

## 2020-05-18 PROCEDURE — 85004 AUTOMATED DIFF WBC COUNT: CPT | Performed by: PHYSICIAN ASSISTANT

## 2020-05-18 PROCEDURE — 85048 AUTOMATED LEUKOCYTE COUNT: CPT | Performed by: PHYSICIAN ASSISTANT

## 2020-05-18 RX ORDER — HYDROCODONE BITARTRATE AND ACETAMINOPHEN 10; 325 MG/1; MG/1
.5-1 TABLET ORAL EVERY 8 HOURS PRN
Qty: 60 TABLET | Refills: 0 | Status: SHIPPED | OUTPATIENT
Start: 2020-05-18 | End: 2020-06-17

## 2020-05-18 NOTE — PROGRESS NOTES
"SUBJECTIVE:  Chief Complaint   Patient presents with     Urgent Care     Knee Pain     left knee pain for 2 days. Pt says it has been swelled. Walking with a limp. Pt says he declined a tdap vaccine on 5/1/20 and worred that knee pain may be related to declining the tetanus.      Taco Rooney is a 55 year old male who presents with a chief complaint of left knee pain, swelling, redness and decreased range of motion.  Symptoms began 2 day(s) ago, are moderate and sudden onset  Context:  Injury:Not that he is aware of but states that may have bumped it in the middle of the night.  States that under his kneecap over the \"bumpy part\"  Of the bone.  Area is red and very tender to the touch.  Hurts to bend knee fully.   Denies fevers.  No open skin that he has noticed.  Denies hx of gout or any FH of gout  Pain exacerbated by walking and flexion/extension Relieved by rest.  He treated it initially with ice and does have pain med that takes daily.     He is worried that infected since area red, hot to the touch and tender.  Also several weeks ago he had a small puncture wound on the top of his foot which had healed and no issues with infection and no pain.  Was seen and declines Td shot at that time.  Wants to make  Sure that not related.      FH   No hx of gout    Past Medical History:   Diagnosis Date     Chronic prostatitis      Pelvic floor dysfunction      Personal history of alcoholism (H)      Pure hypercholesterolemia      Unspecified cardiovascular disease      Current Outpatient Medications   Medication Sig Dispense Refill     aspirin 81 MG tablet Take 1 tablet (81 mg) by mouth daily 100 tablet 3     fluticasone (FLONASE) 50 MCG/ACT nasal spray Spray 1 spray into both nostrils daily 16 g 2     HYDROcodone-acetaminophen (NORCO)  MG per tablet Take 0.5-1 tablets by mouth every 8 hours as needed for pain 60 tablet 0     LORazepam (ATIVAN) 0.5 MG tablet ONE-HALF TO ONE EVERY 6 HOURS AS NEEDED FOR ANXIETY 10 " tablet 1     omeprazole (PRILOSEC OTC) 20 MG EC tablet Take 1 tablet (20 mg) by mouth daily 30 tablet 11     simvastatin (ZOCOR) 20 MG tablet TAKE ONE TABLET BY MOUTH EVERY NIGHT AT BEDTIME 90 tablet 1     zolpidem (AMBIEN) 10 MG tablet Take 1 tablet (10 mg) by mouth nightly as needed for sleep 90 tablet 1     Social History     Tobacco Use     Smoking status: Never Smoker     Smokeless tobacco: Never Used   Substance Use Topics     Alcohol use: Yes     Alcohol/week: 0.0 standard drinks       ROS:  Review of systems negative except as stated below    EXAM:   BP (!) 157/111   Pulse 91   Temp 99.1  F (37.3  C) (Tympanic)   Wt 71.7 kg (158 lb)   SpO2 96%   BMI 24.20 kg/m    M/S Exam: knee left  With no joint pain, tenderness or swelling noted. Focal redness, tenderness and warmth surrounding tibial tuberosity area and over infrapatellar bursa region.  Able to extend fully but painful to flex.     GENERAL APPEARANCE: healthy, alert and no distress  EXTREMITIES: peripheral pulses normal  SKIN: redness noted. No rashes or open skin   NEURO: Normal strength and tone, sensory exam grossly normal, mentation intact and speech normal    X-RAY was done.  No acute findings per my read pending radiology review    Results for orders placed or performed in visit on 05/18/20   XR Knee Left 3 Views     Status: None    Narrative    KNEE LEFT THREE VIEW 5/18/2020 2:16 PM     HISTORY: Redness and swelling of knee.    COMPARISON: None.      Impression    IMPRESSION: No bony or soft tissue abnormality. No joint space  effusion.    AGUSTÍN EMERSON MD   Results for orders placed or performed in visit on 05/18/20   WBC with Diff     Status: Abnormal   Result Value Ref Range    WBC 9.0 4.0 - 11.0 10e9/L    % Neutrophils 84.7 %    % Lymphocytes 6.9 %    % Monocytes 8.0 %    % Eosinophils 0.3 %    % Basophils 0.1 %    Absolute Neutrophil 7.6 1.6 - 8.3 10e9/L    Absolute Lymphocytes 0.6 (L) 0.8 - 5.3 10e9/L    Absolute Monocytes 0.7 0.0 -  1.3 10e9/L    Absolute Eosinophils 0.0 0.0 - 0.7 10e9/L    Absolute Basophils 0.0 0.0 - 0.2 10e9/L    Diff Method Automated Method    ESR: Erythrocyte sedimentation rate     Status: None   Result Value Ref Range    Sed Rate 4 0 - 20 mm/h     URIC ACID - results pending     ASSESSMENT:     Encounter Diagnoses   Name Primary?     Redness and swelling of knee      Infrapatellar bursitis of left knee      Cellulitis of knee, left Yes       PLAN:  Will place on Augmentin for infection at this time but reassuring WBC and does not appear to be septic joint.  Has pain med at home and will use ice and also advised Ibuprofen for inflammation.  Normal sed rate.  Cannot exclude gout at this time and uric acid pending but no hx, risk factors or FH known.   Follow-up with PCP as needed and red flag signs discussed  And RTC if fevers, increased pain or redness.

## 2020-05-18 NOTE — TELEPHONE ENCOUNTER
Routing refill request to provider for review/approval because:  Drug not on the [facility] refill protocol     Patricia Gallegos RN   [facility] -- Triage Nurse

## 2020-05-19 LAB — URATE SERPL-MCNC: 7.4 MG/DL (ref 3.5–7.2)

## 2020-06-17 ENCOUNTER — MYC REFILL (OUTPATIENT)
Dept: PEDIATRICS | Facility: CLINIC | Age: 55
End: 2020-06-17

## 2020-06-17 DIAGNOSIS — G58.8 PUDENDAL NEURALGIA: ICD-10-CM

## 2020-06-18 RX ORDER — HYDROCODONE BITARTRATE AND ACETAMINOPHEN 10; 325 MG/1; MG/1
.5-1 TABLET ORAL EVERY 8 HOURS PRN
Qty: 60 TABLET | Refills: 0 | Status: SHIPPED | OUTPATIENT
Start: 2020-06-18 | End: 2020-07-16

## 2020-07-08 ENCOUNTER — MYC REFILL (OUTPATIENT)
Dept: PEDIATRICS | Facility: CLINIC | Age: 55
End: 2020-07-08

## 2020-07-08 DIAGNOSIS — F51.01 PRIMARY INSOMNIA: ICD-10-CM

## 2020-07-08 RX ORDER — ZOLPIDEM TARTRATE 10 MG/1
10 TABLET ORAL
Qty: 30 TABLET | Refills: 0 | Status: SHIPPED | OUTPATIENT
Start: 2020-07-08 | End: 2020-08-06

## 2020-07-16 ENCOUNTER — MYC REFILL (OUTPATIENT)
Dept: PEDIATRICS | Facility: CLINIC | Age: 55
End: 2020-07-16

## 2020-07-16 DIAGNOSIS — G58.8 PUDENDAL NEURALGIA: ICD-10-CM

## 2020-07-16 RX ORDER — HYDROCODONE BITARTRATE AND ACETAMINOPHEN 10; 325 MG/1; MG/1
.5-1 TABLET ORAL EVERY 8 HOURS PRN
Qty: 60 TABLET | Refills: 0 | Status: SHIPPED | OUTPATIENT
Start: 2020-07-16 | End: 2020-08-19

## 2020-07-16 NOTE — TELEPHONE ENCOUNTER
Routing refill request to provider for review/approval because:  Drug not on the [facility] refill protocol     Elisha Ellis RN on 7/16/2020 at 9:32 AM

## 2020-08-06 ENCOUNTER — MYC REFILL (OUTPATIENT)
Dept: PEDIATRICS | Facility: CLINIC | Age: 55
End: 2020-08-06

## 2020-08-06 DIAGNOSIS — F51.01 PRIMARY INSOMNIA: ICD-10-CM

## 2020-08-06 RX ORDER — ZOLPIDEM TARTRATE 10 MG/1
10 TABLET ORAL
Qty: 30 TABLET | Refills: 2 | Status: SHIPPED | OUTPATIENT
Start: 2020-08-06 | End: 2020-10-12

## 2020-08-19 ENCOUNTER — MYC REFILL (OUTPATIENT)
Dept: PEDIATRICS | Facility: CLINIC | Age: 55
End: 2020-08-19

## 2020-08-19 DIAGNOSIS — G58.8 PUDENDAL NEURALGIA: ICD-10-CM

## 2020-08-19 RX ORDER — HYDROCODONE BITARTRATE AND ACETAMINOPHEN 10; 325 MG/1; MG/1
.5-1 TABLET ORAL EVERY 8 HOURS PRN
Qty: 60 TABLET | Refills: 0 | Status: SHIPPED | OUTPATIENT
Start: 2020-08-19 | End: 2020-09-18

## 2020-08-19 NOTE — TELEPHONE ENCOUNTER
Called patient and assisted in scheduling next available for annual physical on 10/09.     Leigh Flynn MA 11:51 AM 8/19/2020

## 2020-08-19 NOTE — TELEPHONE ENCOUNTER
Refilled, patient due for chronic pain follow-up with PCP. Please assist in scheduling.    Christine Castillo MD  Internal Medicine-Pediatrics

## 2020-09-08 ENCOUNTER — MYC REFILL (OUTPATIENT)
Dept: PEDIATRICS | Facility: CLINIC | Age: 55
End: 2020-09-08

## 2020-09-08 DIAGNOSIS — F51.01 PRIMARY INSOMNIA: ICD-10-CM

## 2020-09-08 RX ORDER — ZOLPIDEM TARTRATE 10 MG/1
10 TABLET ORAL
Qty: 30 TABLET | Refills: 2 | Status: CANCELLED | OUTPATIENT
Start: 2020-09-08

## 2020-09-08 NOTE — TELEPHONE ENCOUNTER
Routing refill request to provider for review/approval because:  Drug not on the [facility] refill protocol     Elisha Ellis RN on 9/8/2020 at 8:21 AM

## 2020-09-18 ENCOUNTER — MYC REFILL (OUTPATIENT)
Dept: PEDIATRICS | Facility: CLINIC | Age: 55
End: 2020-09-18

## 2020-09-18 DIAGNOSIS — G58.8 PUDENDAL NEURALGIA: ICD-10-CM

## 2020-09-18 RX ORDER — HYDROCODONE BITARTRATE AND ACETAMINOPHEN 10; 325 MG/1; MG/1
TABLET ORAL
Qty: 60 TABLET | Refills: 0 | OUTPATIENT
Start: 2020-09-18

## 2020-09-18 RX ORDER — HYDROCODONE BITARTRATE AND ACETAMINOPHEN 10; 325 MG/1; MG/1
.5-1 TABLET ORAL EVERY 8 HOURS PRN
Qty: 60 TABLET | Refills: 0 | Status: SHIPPED | OUTPATIENT
Start: 2020-09-18 | End: 2020-10-09

## 2020-09-18 NOTE — TELEPHONE ENCOUNTER
Routing refill request to provider for review/approval because:  Drug not on the [facility] refill protocol     Patricia Gallegos RN   [facility] -- Triage Nurse

## 2020-09-18 NOTE — TELEPHONE ENCOUNTER
Routing refill request to provider for review/approval because:  Drug not on the [facility] refill protocol     Patricia Gallegos RN   [facility] -- Triage Nurse

## 2020-10-09 ENCOUNTER — OFFICE VISIT (OUTPATIENT)
Dept: PEDIATRICS | Facility: CLINIC | Age: 55
End: 2020-10-09
Payer: MEDICARE

## 2020-10-09 VITALS
BODY MASS INDEX: 23.74 KG/M2 | SYSTOLIC BLOOD PRESSURE: 162 MMHG | HEART RATE: 66 BPM | TEMPERATURE: 98.1 F | WEIGHT: 155 LBS | DIASTOLIC BLOOD PRESSURE: 94 MMHG | OXYGEN SATURATION: 95 % | RESPIRATION RATE: 18 BRPM

## 2020-10-09 DIAGNOSIS — G89.4 CHRONIC PAIN SYNDROME: ICD-10-CM

## 2020-10-09 DIAGNOSIS — Z00.00 ENCOUNTER FOR MEDICARE ANNUAL WELLNESS EXAM: Primary | ICD-10-CM

## 2020-10-09 DIAGNOSIS — F51.01 PRIMARY INSOMNIA: ICD-10-CM

## 2020-10-09 DIAGNOSIS — Z23 NEED FOR VACCINATION: ICD-10-CM

## 2020-10-09 DIAGNOSIS — G58.8 PUDENDAL NEURALGIA: ICD-10-CM

## 2020-10-09 DIAGNOSIS — J37.0 CHRONIC LARYNGITIS: ICD-10-CM

## 2020-10-09 DIAGNOSIS — E78.5 HYPERLIPIDEMIA WITH TARGET LDL LESS THAN 130: ICD-10-CM

## 2020-10-09 PROCEDURE — 80053 COMPREHEN METABOLIC PANEL: CPT | Performed by: INTERNAL MEDICINE

## 2020-10-09 PROCEDURE — 80307 DRUG TEST PRSMV CHEM ANLYZR: CPT | Mod: 90 | Performed by: INTERNAL MEDICINE

## 2020-10-09 PROCEDURE — 36415 COLL VENOUS BLD VENIPUNCTURE: CPT | Performed by: INTERNAL MEDICINE

## 2020-10-09 PROCEDURE — 80061 LIPID PANEL: CPT | Performed by: INTERNAL MEDICINE

## 2020-10-09 PROCEDURE — G0439 PPPS, SUBSEQ VISIT: HCPCS | Performed by: INTERNAL MEDICINE

## 2020-10-09 PROCEDURE — 99000 SPECIMEN HANDLING OFFICE-LAB: CPT | Performed by: INTERNAL MEDICINE

## 2020-10-09 ASSESSMENT — ENCOUNTER SYMPTOMS
ARTHRALGIAS: 0
CONSTIPATION: 0
DYSURIA: 0
SORE THROAT: 0
DIZZINESS: 0
HEARTBURN: 0
PARESTHESIAS: 0
PALPITATIONS: 1
NERVOUS/ANXIOUS: 1
MYALGIAS: 1
EYE PAIN: 0
JOINT SWELLING: 0
NAUSEA: 0
HEMATOCHEZIA: 0
WEAKNESS: 0
HEMATURIA: 0
CHILLS: 0
FEVER: 0
FREQUENCY: 1
HEADACHES: 0
SHORTNESS OF BREATH: 0
COUGH: 0
ABDOMINAL PAIN: 0
DIARRHEA: 0

## 2020-10-09 ASSESSMENT — ACTIVITIES OF DAILY LIVING (ADL): CURRENT_FUNCTION: NO ASSISTANCE NEEDED

## 2020-10-09 NOTE — PATIENT INSTRUCTIONS
Patient Education   Personalized Prevention Plan  You are due for the preventive services outlined below.  Your care team is available to assist you in scheduling these services.  If you have already completed any of these items, please share that information with your care team to update in your medical record.  Health Maintenance Due   Topic Date Due     ANNUAL REVIEW OF HM ORDERS  1965     Discuss Advance Care Planning  1965     HIV Screening  04/08/1978     Colorectal Cancer Screening  07/24/2003     Zoster (Shingles) Vaccine (1 of 2) 04/08/2013     Annual Wellness Visit  01/13/2015     Diptheria Tetanus Pertussis (DTAP/TDAP/TD) Vaccine (2 - Td) 09/26/2015     PHQ-2  01/01/2020     URINE DRUG SCREEN  04/22/2020     Flu Vaccine (1) 09/01/2020

## 2020-10-09 NOTE — PROGRESS NOTES
"SUBJECTIVE:   Taco Rooney is a 55 year old male who presents for Preventive Visit.     Patient has been advised of split billing requirements and indicates understanding: Yes   Are you in the first 12 months of your Medicare coverage?  No    Healthy Habits:     In general, how would you rate your overall health?  Good    Frequency of exercise:  4-5 days/week    Duration of exercise:  30-45 minutes    Do you usually eat at least 4 servings of fruit and vegetables a day, include whole grains    & fiber and avoid regularly eating high fat or \"junk\" foods?  No    Taking medications regularly:  Yes    Medication side effects:  None    Ability to successfully perform activities of daily living:  No assistance needed    Home Safety:  No safety concerns identified    Hearing Impairment:  No hearing concerns    In the past 6 months, have you been bothered by leaking of urine? Yes    In general, how would you rate your overall mental or emotional health?  Fair      PHQ-2 Total Score: 1    Additional concerns today:  No    Do you feel safe in your environment? Yes    Have you ever done Advance Care Planning? (For example, a Health Directive, POLST, or a discussion with a medical provider or your loved ones about your wishes): No, advance care planning information given to patient to review.  Patient plans to discuss their wishes with loved ones or provider.      Fall risk - fall risk not populated on screenings but pt. Denies any falls in the last 2 years    Cognitive Screening   1) Repeat 3 items (Leader, Season, Table)    2) Clock draw: NORMAL  3) 3 item recall: Recalls 3 objects  Results: NORMAL clock, 1-2 items recalled: COGNITIVE IMPAIRMENT LESS LIKELY    Mini-CogTM Copyright ALISSA Dickey. Licensed by the author for use in [facility]; reprinted with permission (dominic@.[facility]). All rights reserved.      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 " Unresponsive to multiple therapy trials. Continues to need opiates to manage pain. Frequent position changes as well. No significant change in sx recently.    Hyperlipidemia. Tolerating statin.     Insomnia. Reviewed medications.    Reviewed and updated as needed this visit by Provider  Tobacco  Allergies  Meds  Problems  Med Hx  Surg Hx  Fam Hx         Social History     Tobacco Use     Smoking status: Never Smoker     Smokeless tobacco: Never Used   Substance Use Topics     Alcohol use: Yes     Alcohol/week: 0.0 standard drinks     If you drink alcohol do you typically have >3 drinks per day or >7 drinks per week? No    Alcohol Use 1/13/2014   Prescreen: >3 drinks/day or >7 drinks/week? The patient does not drink >3 drinks per day nor >7 drinks per week.       Current providers sharing in care for this patient include:   Patient Care Team:  Eriberto Strange MD as PCP - General  Eriberto Strange MD as Assigned PCP    The following health maintenance items are reviewed in Epic and correct as of today:  Health Maintenance   Topic Date Due     ANNUAL REVIEW OF HM ORDERS  1965     ADVANCE CARE PLANNING  1965     HIV SCREENING  04/08/1978     COLORECTAL CANCER SCREENING  07/24/2003     ZOSTER IMMUNIZATION (1 of 2) 04/08/2013     MEDICARE ANNUAL WELLNESS VISIT  01/13/2015     DTAP/TDAP/TD IMMUNIZATION (2 - Td) 09/26/2015     PHQ-2  01/01/2020     URINE DRUG SCREEN  04/22/2020     INFLUENZA VACCINE (1) 09/01/2020     LIPID  01/14/2021     HEPATITIS C SCREENING  Completed     Pneumococcal Vaccine: Pediatrics (0 to 5 Years) and At-Risk Patients (6 to 64 Years)  Aged Out     IPV IMMUNIZATION  Aged Out     MENINGITIS IMMUNIZATION  Aged Out     HEPATITIS B IMMUNIZATION  Aged Out     Review of Systems   Constitutional: Negative for chills and fever.   HENT: Negative for congestion, ear pain, hearing loss and sore throat.    Eyes: Negative for pain and visual disturbance.   Respiratory: Negative for cough and  "shortness of breath.    Cardiovascular: Positive for palpitations. Negative for chest pain and peripheral edema.   Gastrointestinal: Negative for abdominal pain, constipation, diarrhea, heartburn, hematochezia and nausea.   Genitourinary: Positive for frequency. Negative for discharge, dysuria, genital sores, hematuria, impotence and urgency.   Musculoskeletal: Positive for myalgias. Negative for arthralgias and joint swelling.   Skin: Negative for rash.   Neurological: Negative for dizziness, weakness, headaches and paresthesias.   Psychiatric/Behavioral: Negative for mood changes. The patient is nervous/anxious.        OBJECTIVE:   BP (!) 162/94   Pulse 66   Temp 98.1  F (36.7  C) (Tympanic)   Resp 18   Wt 70.3 kg (155 lb)   SpO2 95%   BMI 23.74 kg/m   Estimated body mass index is 23.74 kg/m  as calculated from the following:    Height as of 4/22/19: 1.721 m (5' 7.75\").    Weight as of this encounter: 70.3 kg (155 lb).  Physical Exam  GENERAL: healthy, alert and no distress  EYES: Eyes grossly normal to inspection, PERRL and conjunctivae and sclerae normal  HENT: ear canals and TM's normal  NECK: no adenopathy, no asymmetry, masses, or scars and thyroid normal to palpation  RESP: lungs clear to auscultation - no rales, rhonchi or wheezes  CV: regular rate and rhythm, normal S1 S2, no S3 or S4, no murmur, click or rub, no peripheral edema and peripheral pulses strong  ABDOMEN: soft, nontender, no hepatosplenomegaly, no masses and bowel sounds normal  MS: no gross musculoskeletal defects noted, no edema  SKIN: no suspicious lesions or rashes  NEURO: Normal strength and tone, mentation intact and speech normal  PSYCH: mentation appears normal, affect normal/bright      ASSESSMENT / PLAN:       ICD-10-CM    1. Encounter for Medicare annual wellness exam  Z00.00    2. Insomnia  F51.01 zolpidem (AMBIEN) 10 MG tablet   3. Pudendal neuralgia  G58.8 Drug  Screen Comprehensive , Urine with Reported Meds (MedTox) (Pain " "Care Package)     HYDROcodone-acetaminophen (NORCO)  MG per tablet   4. Hyperlipidemia with target LDL less than 130  E78.5 Lipid panel reflex to direct LDL Fasting     Comprehensive metabolic panel     simvastatin (ZOCOR) 20 MG tablet   5. Chronic laryngitis  J37.0 omeprazole (PRILOSEC OTC) 20 MG EC tablet   6. Need for vaccination  Z23    7. Chronic pain syndrome  G89.4 Drug  Screen Comprehensive , Urine with Reported Meds (MedTox) (Pain Care Package)         COUNSELING:  Reviewed preventive health counseling, as reflected in patient instructions    Estimated body mass index is 23.74 kg/m  as calculated from the following:    Height as of 4/22/19: 1.721 m (5' 7.75\").    Weight as of this encounter: 70.3 kg (155 lb).      He reports that he has never smoked. He has never used smokeless tobacco.      Appropriate preventive services were discussed with this patient, including applicable screening as appropriate for cardiovascular disease, diabetes, osteopenia/osteoporosis, and glaucoma.  As appropriate for age/gender, discussed screening for colorectal cancer, prostate cancer. Checklist reviewing preventive services available has been given to the patient.    Reviewed patients plan of care and provided an AVS. The Basic Care Plan (routine screening as documented in Health Maintenance) for Taco meets the Care Plan requirement. This Care Plan has been established and reviewed with the Patient.    Counseling Resources:  ATP IV Guidelines  Pooled Cohorts Equation Calculator  Breast Cancer Risk Calculator  Breast Cancer: Medication to Reduce Risk  FRAX Risk Assessment  ICSI Preventive Guidelines  Dietary Guidelines for Americans, 2010  [employer]'s MyPlate  ASA Prophylaxis  Lung CA Screening    Eriberto Strange MD  [facility]    Identified Health Risks:  "

## 2020-10-10 LAB
ALBUMIN SERPL-MCNC: 4.7 G/DL (ref 3.4–5)
ALP SERPL-CCNC: 100 U/L (ref 40–150)
ALT SERPL W P-5'-P-CCNC: 66 U/L (ref 0–70)
ANION GAP SERPL CALCULATED.3IONS-SCNC: 8 MMOL/L (ref 3–14)
AST SERPL W P-5'-P-CCNC: 55 U/L (ref 0–45)
BILIRUB SERPL-MCNC: 1.5 MG/DL (ref 0.2–1.3)
BUN SERPL-MCNC: 14 MG/DL (ref 7–30)
CALCIUM SERPL-MCNC: 9.5 MG/DL (ref 8.5–10.1)
CHLORIDE SERPL-SCNC: 106 MMOL/L (ref 94–109)
CHOLEST SERPL-MCNC: 119 MG/DL
CO2 SERPL-SCNC: 25 MMOL/L (ref 20–32)
CREAT SERPL-MCNC: 1.24 MG/DL (ref 0.66–1.25)
GFR SERPL CREATININE-BSD FRML MDRD: 64 ML/MIN/{1.73_M2}
GLUCOSE SERPL-MCNC: 89 MG/DL (ref 70–99)
HDLC SERPL-MCNC: 45 MG/DL
LDLC SERPL CALC-MCNC: 52 MG/DL
NONHDLC SERPL-MCNC: 74 MG/DL
POTASSIUM SERPL-SCNC: 4.3 MMOL/L (ref 3.4–5.3)
PROT SERPL-MCNC: 7.8 G/DL (ref 6.8–8.8)
SODIUM SERPL-SCNC: 139 MMOL/L (ref 133–144)
TRIGL SERPL-MCNC: 108 MG/DL

## 2020-10-12 RX ORDER — OMEPRAZOLE 20 MG/1
20 TABLET, DELAYED RELEASE ORAL DAILY
Qty: 30 TABLET | Refills: 11 | Status: SHIPPED | OUTPATIENT
Start: 2020-10-12 | End: 2024-03-11

## 2020-10-12 RX ORDER — HYDROCODONE BITARTRATE AND ACETAMINOPHEN 10; 325 MG/1; MG/1
.5-1 TABLET ORAL EVERY 8 HOURS PRN
Qty: 60 TABLET | Refills: 0 | Status: SHIPPED | OUTPATIENT
Start: 2020-10-15 | End: 2020-11-16

## 2020-10-12 RX ORDER — SIMVASTATIN 20 MG
TABLET ORAL
Qty: 90 TABLET | Refills: 3 | Status: SHIPPED | OUTPATIENT
Start: 2020-10-12 | End: 2021-11-19

## 2020-10-12 RX ORDER — ZOLPIDEM TARTRATE 10 MG/1
10 TABLET ORAL
Qty: 30 TABLET | Refills: 2 | Status: SHIPPED | OUTPATIENT
Start: 2020-10-12 | End: 2021-02-01

## 2020-10-12 ASSESSMENT — ENCOUNTER SYMPTOMS
DIARRHEA: 0
WEAKNESS: 0
DYSURIA: 0
SORE THROAT: 0
FREQUENCY: 1
ARTHRALGIAS: 0
PALPITATIONS: 1
FEVER: 0
HEARTBURN: 0
SHORTNESS OF BREATH: 0
CONSTIPATION: 0
NERVOUS/ANXIOUS: 1
DIZZINESS: 0
HEMATURIA: 0
MYALGIAS: 1
CHILLS: 0
ABDOMINAL PAIN: 0
HEMATOCHEZIA: 0
EYE PAIN: 0
NAUSEA: 0
HEADACHES: 0
JOINT SWELLING: 0
PARESTHESIAS: 0
COUGH: 0

## 2020-10-12 ASSESSMENT — ACTIVITIES OF DAILY LIVING (ADL): CURRENT_FUNCTION: NO ASSISTANCE NEEDED

## 2020-10-14 LAB — PAIN DRUG SCR UR W RPTD MEDS: NORMAL

## 2020-11-16 ENCOUNTER — MYC REFILL (OUTPATIENT)
Dept: PEDIATRICS | Facility: CLINIC | Age: 55
End: 2020-11-16

## 2020-11-16 DIAGNOSIS — G58.8 PUDENDAL NEURALGIA: ICD-10-CM

## 2020-11-16 RX ORDER — HYDROCODONE BITARTRATE AND ACETAMINOPHEN 10; 325 MG/1; MG/1
.5-1 TABLET ORAL EVERY 8 HOURS PRN
Qty: 60 TABLET | Refills: 0 | Status: SHIPPED | OUTPATIENT
Start: 2020-11-16 | End: 2020-12-18

## 2020-11-16 NOTE — TELEPHONE ENCOUNTER
Routing refill request to provider for review/approval because:  Drug not on the [facility] refill protocol     Patricia Gallegos RN   [facility] -- Triage Nurse

## 2020-12-18 DIAGNOSIS — G58.8 PUDENDAL NEURALGIA: ICD-10-CM

## 2020-12-18 RX ORDER — HYDROCODONE BITARTRATE AND ACETAMINOPHEN 10; 325 MG/1; MG/1
TABLET ORAL
Qty: 60 TABLET | Refills: 0 | Status: SHIPPED | OUTPATIENT
Start: 2020-12-18 | End: 2021-01-19

## 2020-12-18 NOTE — TELEPHONE ENCOUNTER
Routing refill request to provider for review/approval because:  Drug not on the [facility] refill protocol     Elisha Ellis RN on 12/18/2020 at 9:19 AM

## 2021-01-05 ENCOUNTER — VIRTUAL VISIT (OUTPATIENT)
Dept: PEDIATRICS | Facility: CLINIC | Age: 56
End: 2021-01-05
Payer: MEDICARE

## 2021-01-05 DIAGNOSIS — G58.8 PUDENDAL NEURALGIA: Primary | ICD-10-CM

## 2021-01-05 DIAGNOSIS — K21.9 GASTROESOPHAGEAL REFLUX DISEASE WITHOUT ESOPHAGITIS: ICD-10-CM

## 2021-01-05 DIAGNOSIS — R00.2 PALPITATIONS: ICD-10-CM

## 2021-01-05 DIAGNOSIS — G89.4 CHRONIC PAIN SYNDROME: ICD-10-CM

## 2021-01-05 PROCEDURE — 99443 PR PHYSICIAN TELEPHONE EVALUATION 21-30 MIN: CPT | Mod: 95 | Performed by: INTERNAL MEDICINE

## 2021-01-05 NOTE — PROGRESS NOTES
Having sx of palpitations.  Thinks is likely related to anxiety.  Sx are worse if he lies on his right side. Noting some mid chest pain if this occurs.  Does have GERD. Treating w/ omeprazole.  Recently has sx when lying on the left side or his back.    Takes zolpidem for sleep. No anxiety when takes zolpidem.     Has rx for lorazepam. Does not take on a regular basis - last rx for 10 tablets nearly 1 year ago.

## 2021-01-05 NOTE — PROGRESS NOTES
"Taco Rooney is a 55 year old male who is being evaluated via a billable telephone visit.      What phone number would you like to be contacted at? ***  How would you like to obtain your AVS? {AVS Preference:053954}  {PROVIDER CHARTING PREFERENCE:318330}    Subjective     Taco Rooney is a 55 year old who presents to clinic today for the following health issues {ACCOMPANIED BY STATEMENT (Optional):968037}    HPI       Abdominal/Flank Pain  Onset/Duration: ***  Description:   Character: {.:021257}  Location: {.:489054}  Radiation: {.:839503::\"None\"}  Intensity: {.:033083}  Progression of Symptoms:  {.:699250}  Accompanying Signs & Symptoms:  Fever/Chills: {.:811832::\"no\"}  Gas/Bloating: {.:184786::\"no\"}  Nausea: {.:726180::\"no\"}  Vomitting: {.:636638::\"no\"}  Diarrhea: {.:917074::\"no\"}  Constipation: {.:556390::\"no\"}  Dysuria or Hematuria: {.:494348::\"no\"}  History:   Trauma: {.:195088::\"no\"}  Previous similar pain: {.:913020::\"no\"}  Previous tests done: { .:502879}  Precipitating factors:   Does the pain change with:     Food: {.:591694::\"no\"}    Bowel Movement: {.:740538::\"no\"}    Urination: {.:518116::\"no\"}   Other factors:  {.:841128::\"no\"}  Therapies tried and outcome: {NONEORCHOOSE:006651::\"None\"}      {additonal problems for provider to add (Optional):595609}    Review of Systems   {ROS COMP (Optional):098893}      Objective           Vitals:  No vitals were obtained today due to virtual visit.    Physical Exam   {GENERAL APPEARANCE:50::\"healthy\",\"alert\",\"no distress\"}  PSYCH: Alert and oriented times 3; coherent speech, normal   rate and volume, able to articulate logical thoughts, able   to abstract reason, no tangential thoughts, no hallucinations   or delusions  His affect is { :0126744::\"normal\"}  RESP: No cough, no audible wheezing, able to talk in full sentences  Remainder of exam unable to be completed due to telephone visits    {Diagnostic Test Results (Optional):962434}    {AMBULATORY ATTESTATION " (Optional):139448}        Phone call duration: *** minutes

## 2021-01-05 NOTE — PROGRESS NOTES
Taco Rooney is a 55 year old male who is being evaluated via a billable telephone visit.      Assessment & Plan       ICD-10-CM    1. Pudendal neuralgia  G58.8 PHYSICAL THERAPY REFERRAL   2. Chronic pain syndrome  G89.4    3. Palpitations  R00.2    4. Gastroesophageal reflux disease without esophagitis  K21.9      Refer back to PT for PN management. At clinic ([facility] Referral placed.  No change w/ medications.    Chest sx - clinically likely MORALES related. Restart omeprazole. Has cardiology appt scheduled tomorrow.     Return in about 1 year (around 1/5/2022) for Routine Visit.    Eriberto Strange MD  [facility] NATAL[facility]    Subjective     Taco Rooney is a 55 year old who presents to clinic today for the following health issues     HPI   Having sx of palpitations.  Thinks is likely related to anxiety.  Sx are worse if he lies on his right side. Noting some mid chest pain if this occurs.  Does have GERD. Treating w/ omeprazole previously - rx stopped several weeks ago (thinks in November).  Recently has sx when lying on the left side or his back.    Does have a cardiology visit scheduled tomorrow.     Takes zolpidem for sleep. No anxiety when takes zolpidem.     Has rx for lorazepam. Does not take on a regular basis - last rx for 10 tablets nearly 1 year ago.      Chronic Pain Follow-Up    Where in your body do you have pain? Pelvic  How has your pain affected your ability to work? Unable to work  Which of these pain treatments have you tried since your last clinic visit? none  How well are you sleeping? Poor  How has your mood been since your last visit? About the same  Have you had a significant life event? Grief or Loss grandmother passed away and is dealing with their poorly maintained Condo  Other aggravating factors: prolonged sitting, prolonged standing, poor posture, sedentary lifestyle, clenching of jaw and repetitive activities - such as painting  Taking medication as directed?  Yes      How many servings of fruits and vegetables do you eat daily?  0-1    On average, how many sweetened beverages do you drink each day (Examples: soda, juice, sweet tea, etc.  Do NOT count diet or artificially sweetened beverages)?   5 caffeine free Pepsis     How many days per week do you exercise enough to make your heart beat faster? 4    How many minutes a day do you exercise enough to make your heart beat faster? 20 - 29    How many days per week do you miss taking your medication? 0        Objective         Vitals:  No vitals were obtained today due to virtual visit.    Physical Exam   GEN: No distress  PSYCH: Alert, affect is normal  RESP: No cough, no audible wheezing, able to talk in full sentences  Remainder of exam unable to be completed due to telephone visit       Phone call duration: 21 minutes

## 2021-01-15 ENCOUNTER — HEALTH MAINTENANCE LETTER (OUTPATIENT)
Age: 56
End: 2021-01-15

## 2021-01-19 DIAGNOSIS — G58.8 PUDENDAL NEURALGIA: ICD-10-CM

## 2021-01-19 RX ORDER — HYDROCODONE BITARTRATE AND ACETAMINOPHEN 10; 325 MG/1; MG/1
TABLET ORAL
Qty: 60 TABLET | Refills: 0 | Status: SHIPPED | OUTPATIENT
Start: 2021-01-19 | End: 2021-02-24

## 2021-01-19 NOTE — TELEPHONE ENCOUNTER
Routing refill request to provider for review/approval because:  Drug not on the [facility] refill protocol     Elisha Ellis RN on 1/19/2021 at 10:42 AM

## 2021-02-01 ENCOUNTER — MYC MEDICAL ADVICE (OUTPATIENT)
Dept: PEDIATRICS | Facility: CLINIC | Age: 56
End: 2021-02-01

## 2021-02-01 DIAGNOSIS — F51.01 PRIMARY INSOMNIA: ICD-10-CM

## 2021-02-01 RX ORDER — ZOLPIDEM TARTRATE 10 MG/1
10 TABLET ORAL
Qty: 30 TABLET | Refills: 2 | Status: SHIPPED | OUTPATIENT
Start: 2021-02-01 | End: 2021-04-30

## 2021-02-01 NOTE — TELEPHONE ENCOUNTER
Pt requested ambien refill through .    Ambien 10 mg      Last Written Prescription Date:  10/12/20  Last Fill Quantity: 30,   # refills: 2  Last Office Visit: 10/9/20(Px) - 1/5/21(VV)  Future Office visit:       Routing refill request to provider for review/approval because:  Drug not on the [facility], [facility] or  Health refill protocol or controlled substance    Marvin, RN  [facility] (PAL)  [facility]

## 2021-02-24 ENCOUNTER — MYC REFILL (OUTPATIENT)
Dept: PEDIATRICS | Facility: CLINIC | Age: 56
End: 2021-02-24

## 2021-02-24 DIAGNOSIS — G58.8 PUDENDAL NEURALGIA: ICD-10-CM

## 2021-02-24 RX ORDER — HYDROCODONE BITARTRATE AND ACETAMINOPHEN 10; 325 MG/1; MG/1
TABLET ORAL
Qty: 60 TABLET | Refills: 0 | Status: SHIPPED | OUTPATIENT
Start: 2021-02-24 | End: 2021-03-25

## 2021-02-24 NOTE — TELEPHONE ENCOUNTER
Routing refill request to provider for review/approval because:  Drug not on the [facility] refill protocol     Naomi Sanchez RN Flex

## 2021-03-24 ENCOUNTER — OFFICE VISIT (OUTPATIENT)
Dept: PEDIATRICS | Facility: CLINIC | Age: 56
End: 2021-03-24
Payer: MEDICARE

## 2021-03-24 VITALS
WEIGHT: 154.6 LBS | OXYGEN SATURATION: 97 % | BODY MASS INDEX: 23.68 KG/M2 | DIASTOLIC BLOOD PRESSURE: 90 MMHG | HEART RATE: 66 BPM | SYSTOLIC BLOOD PRESSURE: 146 MMHG | TEMPERATURE: 98.2 F

## 2021-03-24 DIAGNOSIS — G58.8 PUDENDAL NEURALGIA: Primary | ICD-10-CM

## 2021-03-24 DIAGNOSIS — K21.9 GASTROESOPHAGEAL REFLUX DISEASE WITHOUT ESOPHAGITIS: ICD-10-CM

## 2021-03-24 PROCEDURE — 99213 OFFICE O/P EST LOW 20 MIN: CPT | Performed by: INTERNAL MEDICINE

## 2021-03-24 NOTE — PROGRESS NOTES
Assessment & Plan       ICD-10-CM    1. Pudendal neuralgia  G58.8 HYDROcodone-acetaminophen (NORCO)  MG per tablet   2. Gastroesophageal reflux disease without esophagitis  K21.9      Chronic issues w/ pudendal pain. Refilled hydrocodone today.  Will likely be in [place] in the near future for about 2 months. Will need next refill sent to a pharmacy in [place] - he will send pharmacy information.     Having sx of GERD. Recommend PPI at least prn.     Return in about 6 months (around 9/24/2021) for Medication Recheck.    Eriberto Strange MD  [facility] NATALY España is a 55 year old who presents for the following health issues     HPI     GERD/Heartburn  Onset/Duration: x 3 weeks   Description: heartburn  Intensity: moderate   Progression of Symptoms: worsening  Accompanying Signs & Symptoms:  Does it feel like food gets stuck or trouble swallowing: no  Nausea: no  Vomiting (bloody?): no  Abdominal Pain: on and off  Black-Tarry stools: YES- in past but normal after changing diet   Bloody stools: no  History:  Previous similar episodes: no  Previous ulcers: no  Precipitating factors:   Caffeine use: no  Alcohol use: no  NSAID/Aspirin use: no  Tobacco use: no  Worse with fatty foods and caffeinated drinks.  Alleviating factors: change   Therapies tried and outcome:             Lifestyle changes: diet change            Medications: Omeprazole (Prilosec), change in diet helped, and tums    Has taken omeprazole scheduled, trying to change to prn.  Added apple cider vinegar to help with sx. May be helping at least somewhat.    Persistent issue w/ pudendal pain. Needs pain med refilled.  Will be in [place] for about 2 months in the near future.        Objective    BP (!) 146/90 (BP Location: Right arm, Patient Position: Sitting, Cuff Size: Adult Regular)   Pulse 66   Temp 98.2  F (36.8  C) (Tympanic)   Wt 70.1 kg (154 lb 9.6 oz)   SpO2 97%   BMI 23.68 kg/m    Body mass index is 23.68  kg/m .  Physical Exam   GEN: No distress  SKIN: No rashes  RESP: CTA mainor  CV: RRR. No M.  ABD: BS+. S, ND, NT.

## 2021-03-25 PROBLEM — K21.9 GASTROESOPHAGEAL REFLUX DISEASE WITHOUT ESOPHAGITIS: Status: ACTIVE | Noted: 2021-03-25

## 2021-03-25 RX ORDER — HYDROCODONE BITARTRATE AND ACETAMINOPHEN 10; 325 MG/1; MG/1
TABLET ORAL
Qty: 60 TABLET | Refills: 0 | Status: SHIPPED | OUTPATIENT
Start: 2021-03-25 | End: 2021-04-24

## 2021-03-26 ENCOUNTER — MYC REFILL (OUTPATIENT)
Dept: PEDIATRICS | Facility: CLINIC | Age: 56
End: 2021-03-26

## 2021-03-26 DIAGNOSIS — G58.8 PUDENDAL NEURALGIA: ICD-10-CM

## 2021-03-26 RX ORDER — HYDROCODONE BITARTRATE AND ACETAMINOPHEN 10; 325 MG/1; MG/1
TABLET ORAL
Qty: 60 TABLET | Refills: 0 | Status: CANCELLED | OUTPATIENT
Start: 2021-03-26

## 2021-04-24 ENCOUNTER — MYC REFILL (OUTPATIENT)
Dept: PEDIATRICS | Facility: CLINIC | Age: 56
End: 2021-04-24

## 2021-04-24 DIAGNOSIS — G58.8 PUDENDAL NEURALGIA: ICD-10-CM

## 2021-04-25 RX ORDER — HYDROCODONE BITARTRATE AND ACETAMINOPHEN 10; 325 MG/1; MG/1
TABLET ORAL
Qty: 60 TABLET | Refills: 0 | Status: SHIPPED | OUTPATIENT
Start: 2021-04-25 | End: 2021-05-26

## 2021-04-30 DIAGNOSIS — F51.01 PRIMARY INSOMNIA: ICD-10-CM

## 2021-04-30 RX ORDER — ZOLPIDEM TARTRATE 10 MG/1
TABLET ORAL
Qty: 30 TABLET | Refills: 5 | Status: SHIPPED | OUTPATIENT
Start: 2021-04-30 | End: 2021-10-27

## 2021-04-30 NOTE — TELEPHONE ENCOUNTER
Patient would like to have this filled tomorrow as that is when it is due and he is flying out of town.

## 2021-04-30 NOTE — TELEPHONE ENCOUNTER
Routing refill request to provider for review/approval because:  Drug not on the [facility] refill protocol     Patricia Gallegos RN   [facility] -- Triage Nurse

## 2021-05-26 DIAGNOSIS — G58.8 PUDENDAL NEURALGIA: ICD-10-CM

## 2021-05-26 RX ORDER — HYDROCODONE BITARTRATE AND ACETAMINOPHEN 10; 325 MG/1; MG/1
TABLET ORAL
Qty: 60 TABLET | Refills: 0 | Status: SHIPPED | OUTPATIENT
Start: 2021-05-26 | End: 2021-06-25

## 2021-05-30 ENCOUNTER — RECORDS - HEALTHEAST (OUTPATIENT)
Dept: ADMINISTRATIVE | Facility: CLINIC | Age: 56
End: 2021-05-30

## 2021-06-01 ENCOUNTER — TELEPHONE (OUTPATIENT)
Dept: PEDIATRICS | Facility: CLINIC | Age: 56
End: 2021-06-01

## 2021-06-01 NOTE — TELEPHONE ENCOUNTER
He was at [facility] this weekend for sxs related to covid vaccine. MD there made him nervous that he would need to go to ED if he has a headache. Would like to discuss with Dr. Strange. Scheduled/. Alana August RN on 6/1/2021 at 10:52 AM

## 2021-06-02 ENCOUNTER — OFFICE VISIT (OUTPATIENT)
Dept: PEDIATRICS | Facility: CLINIC | Age: 56
End: 2021-06-02
Payer: MEDICARE

## 2021-06-02 VITALS
BODY MASS INDEX: 22.98 KG/M2 | HEART RATE: 67 BPM | SYSTOLIC BLOOD PRESSURE: 152 MMHG | OXYGEN SATURATION: 97 % | RESPIRATION RATE: 16 BRPM | WEIGHT: 150 LBS | DIASTOLIC BLOOD PRESSURE: 94 MMHG | TEMPERATURE: 97.1 F

## 2021-06-02 DIAGNOSIS — M79.10 MYALGIA: Primary | ICD-10-CM

## 2021-06-02 PROCEDURE — 99213 OFFICE O/P EST LOW 20 MIN: CPT | Performed by: INTERNAL MEDICINE

## 2021-06-02 NOTE — PROGRESS NOTES
Assessment & Plan       ICD-10-CM    1. Myalgia  M79.10      Myalgias, sx following COVID-19 vaccine administration.  Sx are most likely related to the vaccine.  His pains are improving, fever is now gone.   Recommend supportive cares.  He is concerned about blood clotting potential. Low risk at this time. Agree w/ taking ASA daily for the next 2 weeks.     Return in about 2 weeks (around 6/16/2021), or if symptoms worsen or fail to improve.    Eriberto Strange MD  [facility] NATALY España is a 56 year old who presents for the following health issues     HPI     ED/UC Followup:    Facility:  [facility]  Date of visit: 05/28/21  Reason for visit: reaction to [employer] covid vaccine  Current Status: pt states he feels better but some residual effects     Received J&J COVID vaccine 5/21/21.  1 am the morning following the vaccine developed a high fever.  Fever broke later that day.  The following day, started to have diffuse myalgias and arthralgias.  Sx lasted since that time.    Was in [facility] for the vaccine. Was evaluated in the ED in [place], [place] 5/28/21.  Did not require hospital admission.  COVID test was negative 5/29.    Recommended f/u with me.    Having increased anxiety as a result of his symptoms.    Overall his sx are improving at this time.         Objective    BP (!) 152/94   Pulse 67   Temp 97.1  F (36.2  C) (Tympanic)   Resp 16   Wt 68 kg (150 lb)   SpO2 97%   BMI 22.98 kg/m    Body mass index is 22.98 kg/m .  Physical Exam   GEN: No distress  SKIN: No rashes  NECK: Supple  LUNGS: CTA mainor  CV: RRR. No M.  MS: no joint swelling noted.

## 2021-06-25 DIAGNOSIS — G58.8 PUDENDAL NEURALGIA: ICD-10-CM

## 2021-06-25 RX ORDER — HYDROCODONE BITARTRATE AND ACETAMINOPHEN 10; 325 MG/1; MG/1
TABLET ORAL
Qty: 60 TABLET | Refills: 0 | Status: SHIPPED | OUTPATIENT
Start: 2021-06-25 | End: 2021-07-26

## 2021-06-25 NOTE — TELEPHONE ENCOUNTER
Routing refill request to provider for review/approval because:  Drug not on the [facility] refill protocol     Elisha Ellis RN on 6/25/2021 at 10:21 AM

## 2021-07-26 DIAGNOSIS — G58.8 PUDENDAL NEURALGIA: ICD-10-CM

## 2021-07-26 RX ORDER — HYDROCODONE BITARTRATE AND ACETAMINOPHEN 10; 325 MG/1; MG/1
TABLET ORAL
Qty: 60 TABLET | Refills: 0 | Status: SHIPPED | OUTPATIENT
Start: 2021-07-26 | End: 2021-08-24

## 2021-07-26 NOTE — TELEPHONE ENCOUNTER
Routing refill request to provider for review/approval because:  Drug not on the [facility] refill protocol     Patricia Gallegos RN   [facility] -- Triage Nurse

## 2021-08-24 DIAGNOSIS — G58.8 PUDENDAL NEURALGIA: ICD-10-CM

## 2021-08-24 RX ORDER — HYDROCODONE BITARTRATE AND ACETAMINOPHEN 10; 325 MG/1; MG/1
TABLET ORAL
Qty: 60 TABLET | Refills: 0 | Status: SHIPPED | OUTPATIENT
Start: 2021-08-24 | End: 2021-09-22

## 2021-08-24 NOTE — TELEPHONE ENCOUNTER
Routing refill request to provider for review/approval because:  Drug not on the [facility] refill protocol     Patricia Gallegos RN   [facility] -- Triage Nurse

## 2021-09-04 ENCOUNTER — HEALTH MAINTENANCE LETTER (OUTPATIENT)
Age: 56
End: 2021-09-04

## 2021-09-22 DIAGNOSIS — G58.8 PUDENDAL NEURALGIA: ICD-10-CM

## 2021-09-22 RX ORDER — HYDROCODONE BITARTRATE AND ACETAMINOPHEN 10; 325 MG/1; MG/1
TABLET ORAL
Qty: 60 TABLET | Refills: 0 | Status: SHIPPED | OUTPATIENT
Start: 2021-09-22 | End: 2021-10-20

## 2021-10-20 DIAGNOSIS — G58.8 PUDENDAL NEURALGIA: ICD-10-CM

## 2021-10-20 RX ORDER — HYDROCODONE BITARTRATE AND ACETAMINOPHEN 10; 325 MG/1; MG/1
TABLET ORAL
Qty: 60 TABLET | Refills: 0 | Status: SHIPPED | OUTPATIENT
Start: 2021-10-20 | End: 2021-11-19

## 2021-10-26 DIAGNOSIS — K21.9 GASTROESOPHAGEAL REFLUX DISEASE WITHOUT ESOPHAGITIS: Primary | ICD-10-CM

## 2021-10-26 DIAGNOSIS — F51.01 PRIMARY INSOMNIA: ICD-10-CM

## 2021-10-27 RX ORDER — ZOLPIDEM TARTRATE 10 MG/1
TABLET ORAL
Qty: 30 TABLET | Refills: 5 | Status: SHIPPED | OUTPATIENT
Start: 2021-10-27 | End: 2022-04-19

## 2021-10-27 NOTE — TELEPHONE ENCOUNTER
Routing refill request to provider for review/approval because:  Drug not on the [facility] refill protocol   Nehal Pearl RN

## 2021-11-18 DIAGNOSIS — G58.8 PUDENDAL NEURALGIA: ICD-10-CM

## 2021-11-18 DIAGNOSIS — E78.5 HYPERLIPIDEMIA WITH TARGET LDL LESS THAN 130: ICD-10-CM

## 2021-11-18 NOTE — TELEPHONE ENCOUNTER
Routing refill request to provider for review/approval because:   Statins Protocol Failed 11/18/2021 10:44 AM   Protocol Details  LDL on file in past 12 months        LDL Cholesterol Calculated   Date Value Ref Range Status   10/09/2020 52 <100 mg/dL Final     Comment:     Desirable:       <100 mg/dl    Nehal Pearl RN

## 2021-11-19 RX ORDER — SIMVASTATIN 20 MG
TABLET ORAL
Qty: 90 TABLET | Refills: 3 | Status: SHIPPED | OUTPATIENT
Start: 2021-11-19 | End: 2023-01-11

## 2021-11-19 RX ORDER — HYDROCODONE BITARTRATE AND ACETAMINOPHEN 10; 325 MG/1; MG/1
TABLET ORAL
Qty: 60 TABLET | Refills: 0 | Status: SHIPPED | OUTPATIENT
Start: 2021-11-19 | End: 2021-12-17

## 2021-11-23 ENCOUNTER — VIRTUAL VISIT (OUTPATIENT)
Dept: PEDIATRICS | Facility: CLINIC | Age: 56
End: 2021-11-23
Payer: MEDICARE

## 2021-11-23 DIAGNOSIS — G58.8 PUDENDAL NEURALGIA: Primary | ICD-10-CM

## 2021-11-23 DIAGNOSIS — G89.4 CHRONIC PAIN SYNDROME: ICD-10-CM

## 2021-11-23 PROCEDURE — 99213 OFFICE O/P EST LOW 20 MIN: CPT | Mod: 95 | Performed by: INTERNAL MEDICINE

## 2021-11-23 NOTE — PROGRESS NOTES
Taco is a 56 year old who is being evaluated via a billable telephone visit.      Assessment & Plan       ICD-10-CM    1. Pudendal neuralgia  G58.8 Physical Therapy Referral   2. Chronic pain syndrome  G89.4 Physical Therapy Referral     Chronic pain d/t pudendal neuralgia.  Needs to establish with a new PT office near his current residence.  Referral signed.    Return in about 6 months (around 5/23/2022) for Medication Recheck.    Eriberto Strange MD  [facility] NATALY España is a 56 year old who presents for the following health issues     HPI     Recently moved to [place], [place], near [place].  Has chronic pelvic pain.  Occasionally worsens and has done so over the past few weeks.    Is looking into PT options.   Has found a therapy office in the [facility].   [facility] through the [facility].  They need a referral.    No other new c/o today.        Objective           Vitals:  No vitals were obtained today due to virtual visit.    Physical Exam   GEN: No distress  PSYCH: Alert, affect is normal  RESP: No cough, no audible wheezing, able to talk in full sentences  Remainder of exam unable to be completed due to telephone visit            Phone call duration: 9 minutes

## 2021-12-17 DIAGNOSIS — G58.8 PUDENDAL NEURALGIA: ICD-10-CM

## 2021-12-17 RX ORDER — HYDROCODONE BITARTRATE AND ACETAMINOPHEN 10; 325 MG/1; MG/1
TABLET ORAL
Qty: 60 TABLET | Refills: 0 | Status: SHIPPED | OUTPATIENT
Start: 2021-12-20 | End: 2022-01-18

## 2021-12-17 NOTE — TELEPHONE ENCOUNTER
Prescription refill sent.    Dominique Martinez MD  Internal Medicine & Pediatrics  [facility]

## 2021-12-25 ENCOUNTER — HEALTH MAINTENANCE LETTER (OUTPATIENT)
Age: 56
End: 2021-12-25

## 2022-01-18 DIAGNOSIS — G58.8 PUDENDAL NEURALGIA: ICD-10-CM

## 2022-01-18 RX ORDER — HYDROCODONE BITARTRATE AND ACETAMINOPHEN 10; 325 MG/1; MG/1
.5-1 TABLET ORAL EVERY 8 HOURS PRN
Qty: 60 TABLET | Refills: 0 | Status: SHIPPED | OUTPATIENT
Start: 2022-01-18 | End: 2022-02-15

## 2022-01-18 RX ORDER — HYDROCODONE BITARTRATE AND ACETAMINOPHEN 10; 325 MG/1; MG/1
TABLET ORAL
Qty: 60 TABLET | Refills: 0 | OUTPATIENT
Start: 2022-01-18

## 2022-02-15 DIAGNOSIS — G58.8 PUDENDAL NEURALGIA: ICD-10-CM

## 2022-02-15 RX ORDER — HYDROCODONE BITARTRATE AND ACETAMINOPHEN 10; 325 MG/1; MG/1
TABLET ORAL
Qty: 60 TABLET | Refills: 0 | Status: SHIPPED | OUTPATIENT
Start: 2022-02-15 | End: 2022-03-14

## 2022-02-15 NOTE — TELEPHONE ENCOUNTER
Routing refill request to provider for review/approval because:  Drug not on the [facility] refill protocol     Elisha Ellis RN on 2/15/2022 at 12:24 PM

## 2022-03-14 DIAGNOSIS — G58.8 PUDENDAL NEURALGIA: ICD-10-CM

## 2022-03-14 RX ORDER — HYDROCODONE BITARTRATE AND ACETAMINOPHEN 10; 325 MG/1; MG/1
TABLET ORAL
Qty: 60 TABLET | Refills: 0 | Status: SHIPPED | OUTPATIENT
Start: 2022-03-14 | End: 2022-04-12

## 2022-04-12 DIAGNOSIS — G58.8 PUDENDAL NEURALGIA: ICD-10-CM

## 2022-04-12 RX ORDER — HYDROCODONE BITARTRATE AND ACETAMINOPHEN 10; 325 MG/1; MG/1
TABLET ORAL
Qty: 60 TABLET | Refills: 0 | Status: SHIPPED | OUTPATIENT
Start: 2022-04-12 | End: 2022-05-09

## 2022-04-19 DIAGNOSIS — F51.01 PRIMARY INSOMNIA: ICD-10-CM

## 2022-04-19 RX ORDER — ZOLPIDEM TARTRATE 10 MG/1
TABLET ORAL
Qty: 30 TABLET | Refills: 2 | Status: SHIPPED | OUTPATIENT
Start: 2022-04-19 | End: 2022-07-18

## 2022-04-19 NOTE — TELEPHONE ENCOUNTER
Routing refill request to provider for review/approval because:  Drug not on the [facility] refill protocol     Elisha Ellis RN on 4/19/2022 at 12:40 PM

## 2022-05-09 DIAGNOSIS — G58.8 PUDENDAL NEURALGIA: ICD-10-CM

## 2022-05-09 RX ORDER — HYDROCODONE BITARTRATE AND ACETAMINOPHEN 10; 325 MG/1; MG/1
1 TABLET ORAL 3 TIMES DAILY PRN
Qty: 60 TABLET | Refills: 0 | Status: SHIPPED | OUTPATIENT
Start: 2022-05-09 | End: 2022-06-08

## 2022-05-09 NOTE — TELEPHONE ENCOUNTER
Routing refill request to provider for review/approval because:  Drug not on the [facility] refill protocol     Elisha Ellis RN on 5/9/2022 at 9:51 AM

## 2022-06-08 DIAGNOSIS — G58.8 PUDENDAL NEURALGIA: ICD-10-CM

## 2022-06-08 DIAGNOSIS — K21.9 GASTROESOPHAGEAL REFLUX DISEASE WITHOUT ESOPHAGITIS: ICD-10-CM

## 2022-06-08 RX ORDER — HYDROCODONE BITARTRATE AND ACETAMINOPHEN 10; 325 MG/1; MG/1
1 TABLET ORAL 3 TIMES DAILY PRN
Qty: 60 TABLET | Refills: 0 | Status: SHIPPED | OUTPATIENT
Start: 2022-06-08 | End: 2022-06-09

## 2022-06-09 ENCOUNTER — TELEPHONE (OUTPATIENT)
Dept: PEDIATRICS | Facility: CLINIC | Age: 57
End: 2022-06-09
Payer: MEDICARE

## 2022-06-09 DIAGNOSIS — G58.8 PUDENDAL NEURALGIA: ICD-10-CM

## 2022-06-09 NOTE — TELEPHONE ENCOUNTER
Medication Question or Refill    Who is calling: Patient    What medication are you calling about (include dose and sig)?: Norco    Controlled Substance Agreement on file: Yes:     Who prescribed the medication?: Dr. Strange    Do you need a refill? Yes: Patient needs new prescription to [facility] in [place], has current refills at our pharmacy, needs those cancelled and new prescription to [facility], needs to  by Saturday if possible.    When did you use the medication last? Today    Patient offered an appointment? No    Do you have any questions or concerns?  Yes: see above    Requested Pharmacy: [facility] in [place]    Okay to leave a detailed message?: Yes at Cell number on file:    Telephone Information:   Mobile [phone]

## 2022-06-10 RX ORDER — HYDROCODONE BITARTRATE AND ACETAMINOPHEN 10; 325 MG/1; MG/1
1 TABLET ORAL 3 TIMES DAILY PRN
Qty: 60 TABLET | Refills: 0 | Status: SHIPPED | OUTPATIENT
Start: 2022-06-10 | End: 2022-07-08

## 2022-06-20 ENCOUNTER — NURSE TRIAGE (OUTPATIENT)
Dept: PEDIATRICS | Facility: CLINIC | Age: 57
End: 2022-06-20
Payer: MEDICARE

## 2022-06-20 NOTE — TELEPHONE ENCOUNTER
Stung by unknown insect at 12:30pm today. Forefinger started throbbing. Breaking out in hives. Throat feels sore. RN advised patient of red flag symptoms and patient agreed and understands. [place] a UC/ED nearby but does not think it is that bad. RN again informed patient that he needed to call 911 if he develops difficulty breathing. Patient showed understanding.     RN advised patient to try benadryl and patient was going to grab some now. Advised patient to call back or go in to be seen if symptoms do not improve.    STEPHANY Can, RN     Reason for Disposition    Hoarseness, cough or tightness in the throat or chest    Additional Information    Negative: Passed out (i.e., fainted, collapsed and was not responding)    Negative: Wheezing or difficulty breathing    Negative: Swollen tongue or difficulty swallowing    Negative: Life-threatening reaction (anaphylaxis) in the past to same insect bite and < 2 hours since bite    Negative: Sounds like a life-threatening emergency to the triager    Negative: Bee sting(s)    Negative: Spider bite(s)    Negative: Tick bite(s)    Negative: Mosquito bite(s)    Negative: Doesn't sound like an insect bite    Negative: Patient sounds very sick or weak to the triager    Negative: SEVERE bite pain and not improved after 2 hours of pain medicine    Negative: Fever and area is red    Negative: Fever and area is very tender to touch    Negative: Red streak or red line and length > 2 inches (5 cm)    Negative: Red or very tender (to touch) area, and started over 24 hours after the bite    Negative: Red or very tender (to touch) area, getting larger over 48 hours after the bite    Negative: No prior tetanus shots (or is not fully vaccinated)    Negative: Patient wants to be seen    Protocols used: INSECT BITE-A-OH

## 2022-06-22 ENCOUNTER — TELEPHONE (OUTPATIENT)
Dept: PEDIATRICS | Facility: CLINIC | Age: 57
End: 2022-06-22

## 2022-06-22 NOTE — TELEPHONE ENCOUNTER
Hello,    Patient contacted pharmacy because he had a moderate reaction to a bee sting on 6/21. He experienced hives on his torso and legs, swelling at the site and an itchy throat. He treated his symptoms with diphenhydramine and his symptoms have improved since yesterday. He is concerned that he is at high risk for repeated bee stings due to working outside daily. He would like Dr. Strange to prescribe and Epipen. Patient claims he has unsuccessfully tried to reach the clinic and would appreciate if someone could call him back at the number on file to discuss this.     Thanks,    Luciana

## 2022-06-22 NOTE — TELEPHONE ENCOUNTER
Called and spoke with patient, advised e-visit to discuss with provider. Patient verbalized agreement and will submit e-visit.     Eduardo MARIN RN

## 2022-07-07 DIAGNOSIS — G58.8 PUDENDAL NEURALGIA: ICD-10-CM

## 2022-07-08 RX ORDER — HYDROCODONE BITARTRATE AND ACETAMINOPHEN 10; 325 MG/1; MG/1
TABLET ORAL
Qty: 60 TABLET | Refills: 0 | Status: SHIPPED | OUTPATIENT
Start: 2022-07-08 | End: 2022-08-04

## 2022-07-18 DIAGNOSIS — F51.01 PRIMARY INSOMNIA: ICD-10-CM

## 2022-07-18 RX ORDER — ZOLPIDEM TARTRATE 10 MG/1
TABLET ORAL
Qty: 30 TABLET | Refills: 5 | Status: SHIPPED | OUTPATIENT
Start: 2022-07-18 | End: 2023-01-10

## 2022-07-21 ENCOUNTER — TELEPHONE (OUTPATIENT)
Dept: PEDIATRICS | Facility: CLINIC | Age: 57
End: 2022-07-21

## 2022-07-21 NOTE — TELEPHONE ENCOUNTER
Received call from pt  He is requesting an epi pen for bee stings  Pt has been stung in the past and broke out in hives    Advised to pt that he needs a visit to order a new medication  Offered video, telephone, in person or E-visit    Telephone appt made for 7/26/22   Pt verbalized understanding and agrees to the plan    Thank you  Ranjith Oconnor RN on 7/21/2022 at 3:49 PM

## 2022-07-26 ENCOUNTER — VIRTUAL VISIT (OUTPATIENT)
Dept: PEDIATRICS | Facility: CLINIC | Age: 57
End: 2022-07-26
Payer: MEDICARE

## 2022-07-26 DIAGNOSIS — Z91.030 BEE STING ALLERGY: Primary | ICD-10-CM

## 2022-07-26 PROCEDURE — 99213 OFFICE O/P EST LOW 20 MIN: CPT | Mod: 95 | Performed by: INTERNAL MEDICINE

## 2022-07-26 RX ORDER — METOPROLOL SUCCINATE 25 MG/1
25 TABLET, EXTENDED RELEASE ORAL DAILY
COMMUNITY
Start: 2022-07-26

## 2022-07-26 RX ORDER — EPINEPHRINE 0.3 MG/.3ML
0.3 INJECTION SUBCUTANEOUS PRN
Qty: 2 EACH | Refills: 1 | Status: SHIPPED | OUTPATIENT
Start: 2022-07-26

## 2022-07-26 NOTE — PROGRESS NOTES
Taco is a 57 year old who is being evaluated via a billable telephone visit.      Assessment & Plan       ICD-10-CM    1. Bee sting allergy  Z91.030 EPINEPHrine (ANY BX GENERIC EQUIV) 0.3 MG/0.3ML injection 2-pack      Had systemic urticaria after a bee sting.  Recommend carrying diphenhydramine with him.  Epi Pen recommended as well - rx sent in.  Reviewed basics of use, along with the need to seek emergent evaluation should the epi pen be needed.     Eriberto Strange MD  [facility]    Subjective   Taco is a 57 year old, presenting for the following health issues:    HPI     Recently started a job with the [employer], working at [employer].    About 6 weeks ago, stung by a bee in the finger.  Through the day, finger was quite swollen.  Developed hives, noted in the legs and chest.  Had a mild sore throat, but no breathing or swallowing difficulties.  Took Benadryl which helped.    No prior bee reactions.          Objective           Vitals:  No vitals were obtained today due to virtual visit.    Physical Exam   GEN: No distress  PSYCH: Alert, affect is normal  RESP: No cough, no audible wheezing, able to talk in full sentences  Remainder of exam unable to be completed due to telephone visit      Phone call duration: 12 minutes

## 2022-08-04 ENCOUNTER — MYC REFILL (OUTPATIENT)
Dept: PEDIATRICS | Facility: CLINIC | Age: 57
End: 2022-08-04

## 2022-08-04 DIAGNOSIS — G58.8 PUDENDAL NEURALGIA: ICD-10-CM

## 2022-08-04 RX ORDER — HYDROCODONE BITARTRATE AND ACETAMINOPHEN 10; 325 MG/1; MG/1
1 TABLET ORAL 3 TIMES DAILY PRN
Qty: 60 TABLET | Refills: 0 | Status: SHIPPED | OUTPATIENT
Start: 2022-08-04 | End: 2022-09-02

## 2022-09-02 ENCOUNTER — MYC REFILL (OUTPATIENT)
Dept: PEDIATRICS | Facility: CLINIC | Age: 57
End: 2022-09-02

## 2022-09-02 DIAGNOSIS — G58.8 PUDENDAL NEURALGIA: ICD-10-CM

## 2022-09-02 RX ORDER — HYDROCODONE BITARTRATE AND ACETAMINOPHEN 10; 325 MG/1; MG/1
1 TABLET ORAL 3 TIMES DAILY PRN
Qty: 60 TABLET | Refills: 0 | Status: SHIPPED | OUTPATIENT
Start: 2022-09-02 | End: 2022-09-30

## 2022-09-30 ENCOUNTER — MYC REFILL (OUTPATIENT)
Dept: PEDIATRICS | Facility: CLINIC | Age: 57
End: 2022-09-30

## 2022-09-30 DIAGNOSIS — G58.8 PUDENDAL NEURALGIA: ICD-10-CM

## 2022-09-30 RX ORDER — HYDROCODONE BITARTRATE AND ACETAMINOPHEN 10; 325 MG/1; MG/1
1 TABLET ORAL 3 TIMES DAILY PRN
Qty: 60 TABLET | Refills: 0 | Status: SHIPPED | OUTPATIENT
Start: 2022-09-30 | End: 2022-10-28

## 2022-10-22 ENCOUNTER — HEALTH MAINTENANCE LETTER (OUTPATIENT)
Age: 57
End: 2022-10-22

## 2022-10-28 ENCOUNTER — MYC REFILL (OUTPATIENT)
Dept: PEDIATRICS | Facility: CLINIC | Age: 57
End: 2022-10-28

## 2022-10-28 DIAGNOSIS — G58.8 PUDENDAL NEURALGIA: ICD-10-CM

## 2022-10-28 RX ORDER — HYDROCODONE BITARTRATE AND ACETAMINOPHEN 10; 325 MG/1; MG/1
1 TABLET ORAL 3 TIMES DAILY PRN
Qty: 60 TABLET | Refills: 0 | Status: SHIPPED | OUTPATIENT
Start: 2022-10-28 | End: 2022-11-25

## 2022-11-21 DIAGNOSIS — K21.9 GASTROESOPHAGEAL REFLUX DISEASE WITHOUT ESOPHAGITIS: ICD-10-CM

## 2022-11-22 NOTE — TELEPHONE ENCOUNTER
Prescription approved per [facility] Refill Protocol.  Aleida Friend RN, BSN  [facility]

## 2022-11-25 ENCOUNTER — MYC REFILL (OUTPATIENT)
Dept: PEDIATRICS | Facility: CLINIC | Age: 57
End: 2022-11-25

## 2022-11-25 DIAGNOSIS — G58.8 PUDENDAL NEURALGIA: ICD-10-CM

## 2022-11-25 RX ORDER — HYDROCODONE BITARTRATE AND ACETAMINOPHEN 10; 325 MG/1; MG/1
1 TABLET ORAL 3 TIMES DAILY PRN
Qty: 60 TABLET | Refills: 0 | Status: SHIPPED | OUTPATIENT
Start: 2022-11-25 | End: 2022-12-21

## 2022-12-21 ENCOUNTER — MYC REFILL (OUTPATIENT)
Dept: PEDIATRICS | Facility: CLINIC | Age: 57
End: 2022-12-21

## 2022-12-21 DIAGNOSIS — G58.8 PUDENDAL NEURALGIA: ICD-10-CM

## 2022-12-21 RX ORDER — HYDROCODONE BITARTRATE AND ACETAMINOPHEN 10; 325 MG/1; MG/1
1 TABLET ORAL 3 TIMES DAILY PRN
Qty: 60 TABLET | Refills: 0 | Status: SHIPPED | OUTPATIENT
Start: 2022-12-21 | End: 2023-01-17

## 2023-01-09 DIAGNOSIS — E78.5 HYPERLIPIDEMIA WITH TARGET LDL LESS THAN 130: ICD-10-CM

## 2023-01-10 DIAGNOSIS — F51.01 PRIMARY INSOMNIA: ICD-10-CM

## 2023-01-10 RX ORDER — ZOLPIDEM TARTRATE 10 MG/1
TABLET ORAL
Qty: 30 TABLET | Refills: 5 | Status: SHIPPED | OUTPATIENT
Start: 2023-01-10 | End: 2023-05-30

## 2023-01-11 RX ORDER — SIMVASTATIN 20 MG
TABLET ORAL
Qty: 90 TABLET | Refills: 0 | Status: SHIPPED | OUTPATIENT
Start: 2023-01-11 | End: 2023-03-27

## 2023-01-11 NOTE — TELEPHONE ENCOUNTER
Patient has upcoming appointment. Prescription approved per [facility] Refill Protocol.  Eduardo MARIN RN 1/11/2023 at 2:50 PM

## 2023-01-17 ENCOUNTER — MYC REFILL (OUTPATIENT)
Dept: PEDIATRICS | Facility: CLINIC | Age: 58
End: 2023-01-17

## 2023-01-17 DIAGNOSIS — G58.8 PUDENDAL NEURALGIA: ICD-10-CM

## 2023-01-17 RX ORDER — HYDROCODONE BITARTRATE AND ACETAMINOPHEN 10; 325 MG/1; MG/1
1 TABLET ORAL 3 TIMES DAILY PRN
Qty: 60 TABLET | Refills: 0 | Status: SHIPPED | OUTPATIENT
Start: 2023-01-17 | End: 2023-02-13

## 2023-02-13 ENCOUNTER — MYC REFILL (OUTPATIENT)
Dept: PEDIATRICS | Facility: CLINIC | Age: 58
End: 2023-02-13
Payer: MEDICARE

## 2023-02-13 DIAGNOSIS — G58.8 PUDENDAL NEURALGIA: ICD-10-CM

## 2023-02-13 RX ORDER — HYDROCODONE BITARTRATE AND ACETAMINOPHEN 10; 325 MG/1; MG/1
1 TABLET ORAL 3 TIMES DAILY PRN
Qty: 60 TABLET | Refills: 0 | Status: SHIPPED | OUTPATIENT
Start: 2023-02-13 | End: 2023-03-15

## 2023-02-13 NOTE — TELEPHONE ENCOUNTER
Routing refill request to provider for review/approval because:  Drug not on the [facility] refill protocol   See sliceX message, confirm start date  Aleida Friend RN, BSN  [facility]

## 2023-03-15 ENCOUNTER — MYC REFILL (OUTPATIENT)
Dept: PEDIATRICS | Facility: CLINIC | Age: 58
End: 2023-03-15
Payer: MEDICARE

## 2023-03-15 DIAGNOSIS — G58.8 PUDENDAL NEURALGIA: ICD-10-CM

## 2023-03-16 RX ORDER — HYDROCODONE BITARTRATE AND ACETAMINOPHEN 10; 325 MG/1; MG/1
1 TABLET ORAL 3 TIMES DAILY PRN
Qty: 60 TABLET | Refills: 0 | Status: SHIPPED | OUTPATIENT
Start: 2023-03-16 | End: 2023-04-13

## 2023-03-27 ENCOUNTER — OFFICE VISIT (OUTPATIENT)
Dept: PEDIATRICS | Facility: CLINIC | Age: 58
End: 2023-03-27
Payer: MEDICARE

## 2023-03-27 VITALS
RESPIRATION RATE: 20 BRPM | DIASTOLIC BLOOD PRESSURE: 86 MMHG | BODY MASS INDEX: 26.34 KG/M2 | HEIGHT: 65 IN | WEIGHT: 158.1 LBS | TEMPERATURE: 98.4 F | SYSTOLIC BLOOD PRESSURE: 154 MMHG | HEART RATE: 70 BPM | OXYGEN SATURATION: 97 %

## 2023-03-27 DIAGNOSIS — E78.5 HYPERLIPIDEMIA WITH TARGET LDL LESS THAN 130: ICD-10-CM

## 2023-03-27 DIAGNOSIS — G58.8 PUDENDAL NEURALGIA: Primary | ICD-10-CM

## 2023-03-27 DIAGNOSIS — K21.9 GASTROESOPHAGEAL REFLUX DISEASE WITHOUT ESOPHAGITIS: ICD-10-CM

## 2023-03-27 DIAGNOSIS — J31.0 RHINITIS, UNSPECIFIED TYPE: ICD-10-CM

## 2023-03-27 PROCEDURE — 99214 OFFICE O/P EST MOD 30 MIN: CPT | Performed by: INTERNAL MEDICINE

## 2023-03-27 RX ORDER — SIMVASTATIN 20 MG
TABLET ORAL
Qty: 90 TABLET | Refills: 3 | Status: SHIPPED | OUTPATIENT
Start: 2023-03-27

## 2023-03-27 RX ORDER — FLUTICASONE PROPIONATE 50 MCG
1 SPRAY, SUSPENSION (ML) NASAL DAILY
Qty: 48 G | Refills: 3 | Status: SHIPPED | OUTPATIENT
Start: 2023-03-27

## 2023-03-27 ASSESSMENT — ANXIETY QUESTIONNAIRES
GAD7 TOTAL SCORE: 1
IF YOU CHECKED OFF ANY PROBLEMS ON THIS QUESTIONNAIRE, HOW DIFFICULT HAVE THESE PROBLEMS MADE IT FOR YOU TO DO YOUR WORK, TAKE CARE OF THINGS AT HOME, OR GET ALONG WITH OTHER PEOPLE: NOT DIFFICULT AT ALL
2. NOT BEING ABLE TO STOP OR CONTROL WORRYING: NOT AT ALL
1. FEELING NERVOUS, ANXIOUS, OR ON EDGE: NOT AT ALL
5. BEING SO RESTLESS THAT IT IS HARD TO SIT STILL: NOT AT ALL
3. WORRYING TOO MUCH ABOUT DIFFERENT THINGS: SEVERAL DAYS
7. FEELING AFRAID AS IF SOMETHING AWFUL MIGHT HAPPEN: NOT AT ALL
GAD7 TOTAL SCORE: 1
6. BECOMING EASILY ANNOYED OR IRRITABLE: NOT AT ALL

## 2023-03-27 ASSESSMENT — PAIN SCALES - GENERAL: PAINLEVEL: MODERATE PAIN (5)

## 2023-03-27 ASSESSMENT — PATIENT HEALTH QUESTIONNAIRE - PHQ9
SUM OF ALL RESPONSES TO PHQ QUESTIONS 1-9: 0
5. POOR APPETITE OR OVEREATING: NOT AT ALL

## 2023-03-27 NOTE — LETTER
2023      Re: Taco Rooney  : 1965   [address]  [facility] [place] [facility] [place] [facility] [address]      To Whom It May Concern:     I am the primary care physician for Taco Rooney and have been his primary care physician since . He requested that I write this letter reviewing a now chronic medical condition.     I am aware that he served in the [employer] in . He has brought to my attention he was involved in an accident during the time of his service which resulted in a hospitalization and ultimately a medical discharge.     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 His symptoms have progressively worsened over the 17+ years I have known him. His pain requires daily opiates to keep the pain from being unbearable. In review of his chart he has sought multiple different treatments for his pain, including surgery for pudendal nerve decompression in 2006 and later pudendal nerve blocks. He has been evaluated by pain management (from my available records from  - ) and several visits in  with Physical Medicine and Rehabilitation. He has undergone multiple physical therapy visits as well. Unfortunately none of these evaluations or interventions have alleviated his pain.     Numerous medications have similarly failed to resolve his pain.    He currently has been considered disabled due to the severity of his symptoms.    Based on the description of the injury he sustained in  during his active service I cannot state that his pain is definitely related to his fall, but my opinion is that a contribution to his chronic pain symptoms is at least as likely as not related to his injury.    Sincerely,          Eriberto Strange MD

## 2023-03-27 NOTE — PROGRESS NOTES
Assessment & Plan       ICD-10-CM    1. Pudendal neuralgia  G58.8       2. Rhinitis, unspecified type  J31.0 fluticasone (FLONASE) 50 MCG/ACT nasal spray      3. Gastroesophageal reflux disease without esophagitis  K21.9 omeprazole (PRILOSEC) 20 MG DR capsule      4. Hyperlipidemia with target LDL less than 130  E78.5 simvastatin (ZOCOR) 20 MG tablet        Chronic pelvic pain felt related to pudendal neuralgia.   No medication changes made today.   His rx for hydrocodone was recently refilled.     Refilled routine medications as noted above.    He should update fasting lipids in the near future.     Eriberto Strange MD  [facility] España is a 57 year old, presenting for the following health issues:  Musculoskeletal Problem (Pelvic pain)  No flowsheet data found.  History of Present Illness       Reason for visit:  Follow up on chronic pain    He eats 0-1 servings of fruits and vegetables daily.He consumes 3 sweetened beverage(s) daily.He exercises with enough effort to increase his heart rate 20 to 29 minutes per day.  He exercises with enough effort to increase his heart rate 4 days per week.   He is taking medications regularly.       Pain History:  When did you first notice your pain? years   Have you seen this provider for your pain in the past?   Yes   Where in your body do you have pain? Pelvic pain  Are you seeing anyone else for your pain? No    PHQ-9 SCORE 7/11/2012 6/5/2017 3/27/2023   PHQ-9 Total Score 9 - -   PHQ-9 Total Score - 4 0     JOSE-7 SCORE 6/7/2017 3/27/2023   Total Score 6 1     PEG Score 3/27/2023   PEG Total Score 5.33     Taco is here mainly to review his chronic pelvic pain.  Chart reviewed, from my first visits with him in 2005 nearly all visits have been for pelvic pain evaluatoin and management.    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   August 1982, [place]  [place]  During basic training, he fell down concrete stairs after slipping on an oily substance.  He required assistance to get back up the stairs. An ambulance was needed to take him to an emergency department.   Right knee was most severely injured during the fall.  He does recall landing on his tailbone.  I do not have any records relating to this incident so all of this history is directly from Taco.    Reviewed his past evaluations and attempts to manage his symptoms.    He has ongoing opiate requirement to manage his pain.     Management evaluations in addition to my visits include:  Physical Medicine and Rehabilitation (2022 - several visits)  Pain Management (1495-1817)  Physical Therapy  Pudendal nerve block  Pudendal nerve decompression 10/1/2006    We also reviewed his other chronic health conditions.  Chronic rhinitis. Has been helped by Flonase. Would like rx updated.    GERD. Sx controlled w/ omeprazole. No dysphagia.    Hyperlipidemia. Taking statin. No side effects noted.  Lab Results   Component Value Date    LDL 52 10/09/2020    LDL 69 01/14/2016     Not fasting today.    Also due for colon cancer screening. He defers today.         Objective    BP (!) 154/86 (BP Location: Right arm, Patient Position: Sitting, Cuff Size: Adult Large)   Pulse 70   Temp 98.4  F (36.9  C) (Tympanic)   Resp 20   Ht 1.657 m (5' 5.25\")   Wt 71.7 kg (158 lb 1.6 oz)   SpO2 97%   BMI 26.11 kg/m    Body mass index is 26.11 kg/m .  Physical Exam   GEN: No distress  SKIN: No rashes. Healed surgical incisions in the upper gluteal region bilaterally                "

## 2023-04-01 ENCOUNTER — HEALTH MAINTENANCE LETTER (OUTPATIENT)
Age: 58
End: 2023-04-01

## 2023-04-13 ENCOUNTER — MYC MEDICAL ADVICE (OUTPATIENT)
Dept: PEDIATRICS | Facility: CLINIC | Age: 58
End: 2023-04-13
Payer: MEDICARE

## 2023-04-13 ENCOUNTER — MYC REFILL (OUTPATIENT)
Dept: PEDIATRICS | Facility: CLINIC | Age: 58
End: 2023-04-13
Payer: MEDICARE

## 2023-04-13 DIAGNOSIS — G58.8 PUDENDAL NEURALGIA: ICD-10-CM

## 2023-04-13 RX ORDER — HYDROCODONE BITARTRATE AND ACETAMINOPHEN 10; 325 MG/1; MG/1
1 TABLET ORAL 3 TIMES DAILY PRN
Qty: 60 TABLET | Refills: 0 | Status: SHIPPED | OUTPATIENT
Start: 2023-04-13 | End: 2023-05-12

## 2023-04-13 NOTE — TELEPHONE ENCOUNTER
"S-(situation): Patient is experiencing pain in the ball of his foot between the great toe and the second toe.   Describes this as feeling like a \"pebble inside the meaty part of the ball of the foot\".    B-(background): Patient walks for exercise.   Reports that his shoes are old and don't offer a lot of arch support.    A-(assessment): Pain is possibly caused by a Guillory's neuroma    R-(recommendations): If pain doesn't improve after trying new shoes, recommended a visit with Podiatry.   Also advised if pain is significant enough that it is interfering with daily activities, then he should be seen sooner.  My Chart message sent to patient.  JOSE ENRIQUE Collazo RN    "

## 2023-04-17 ENCOUNTER — TELEPHONE (OUTPATIENT)
Dept: PEDIATRICS | Facility: CLINIC | Age: 58
End: 2023-04-17
Payer: MEDICARE

## 2023-04-17 NOTE — TELEPHONE ENCOUNTER
"Patient left  message that the pain in the ball of his left foot has gotten worse.  Per My Chart message of 04/13-Patient is experiencing pain in the ball of his foot between the great toe and the second toe.   Describes this as feeling like a \"pebble inside the meaty part of the ball of the foot\".  He was going to try different shoes and see if that helped correct the problem.  Patient is calling today to state that the pain is worsening, and he would like to schedule an appointment.  Patient leaves for [place] on 04/25.  Will forward to  to assist with scheduling an appointment with Dr. English, Podiatry.  If possible to schedule prior to 04/25, that would be great.  Thank you.  JOSE ENRIQUE Collazo RN      "

## 2023-04-21 ENCOUNTER — OFFICE VISIT (OUTPATIENT)
Dept: PODIATRY | Facility: CLINIC | Age: 58
End: 2023-04-21
Payer: MEDICARE

## 2023-04-21 VITALS
BODY MASS INDEX: 26.33 KG/M2 | SYSTOLIC BLOOD PRESSURE: 128 MMHG | WEIGHT: 158 LBS | DIASTOLIC BLOOD PRESSURE: 78 MMHG | HEIGHT: 65 IN

## 2023-04-21 DIAGNOSIS — M77.52 CAPSULITIS OF ANKLE, LEFT: Primary | ICD-10-CM

## 2023-04-21 DIAGNOSIS — M77.52 CAPSULITIS OF METATARSOPHALANGEAL (MTP) JOINT OF LEFT FOOT: ICD-10-CM

## 2023-04-21 PROCEDURE — 99203 OFFICE O/P NEW LOW 30 MIN: CPT | Performed by: PODIATRIST

## 2023-04-21 NOTE — PROGRESS NOTES
"Foot & Ankle Surgery  April 21, 2023    CC: \"Painful left ball of foot\"    I was asked to see Taco S Toribio regarding the chief complaint by: Self    HPI:  Pt is a 58 year old male who presents with above complaint.  3-week history of left foot pain.  Patient is chronic hip issues and walks for part of his rehab.  He has a history of plantar fasciitis.  Current symptoms started off similar to plantar fasciitis in the heel.  He did stretching exercises and developed pain near the plantar left forefoot.  He also mentions that during walking, there were 2 instances where he stepped on jagged rocks at the area of pain.  He states his shoes are worn out and needs to get new shoes.  Painful \"when I walk\", mentions \"stiffness\", especially in the morning.    ROS:   Pos for CC.  The patient denies current nausea, vomiting, chills, fevers, belly pain, calf pain, chest pain or SOB.  Complete remainder of ROS is otherwise neg.    VITALS:  There were no vitals filed for this visit.    PMH:    Past Medical History:   Diagnosis Date     Chronic prostatitis      Pelvic floor dysfunction      Personal history of alcoholism (H)      Pure hypercholesterolemia      Unspecified cardiovascular disease        SXHX:    Past Surgical History:   Procedure Laterality Date     AK INJECT NERV BLCK,PUDENDAL      Description: Nerve Block Pudendal;  Recorded: 11/01/2012;     [facility] NONSPECIFIC PROCEDURE      neck lesion     [facility] NONSPECIFIC PROCEDURE  10/06    Pudendal Nerve decompression.          MEDS:    Current Outpatient Medications   Medication     aspirin 81 MG tablet     EPINEPHrine (ANY BX GENERIC EQUIV) 0.3 MG/0.3ML injection 2-pack     fluticasone (FLONASE) 50 MCG/ACT nasal spray     HYDROcodone-acetaminophen (NORCO)  MG per tablet     LORazepam (ATIVAN) 0.5 MG tablet     metoprolol succinate ER (TOPROL XL) 25 MG 24 hr tablet     omeprazole (PRILOSEC OTC) 20 MG EC tablet     omeprazole (PRILOSEC) 20 MG DR capsule     simvastatin " (ZOCOR) 20 MG tablet     zolpidem (AMBIEN) 10 MG tablet     No current facility-administered medications for this visit.       ALL:     Allergies   Allergen Reactions     Rifampin      Seasonal Allergies      Sulfa Drugs      hives       FMH:    Family History   Problem Relation Age of Onset     Depression Mother      Diabetes Mother      Depression Father      Alcohol/Drug Father      C.A.D. Father         first MI at age 50's, CABG      Cancer - colorectal Maternal Grandfather      C.A.D. Paternal Grandfather      Prostate Cancer No family hx of      Eye Disorder No family hx of         no glaucoma       SocHx:    Social History     Socioeconomic History     Marital status: Single     Spouse name: Not on file     Number of children: Not on file     Years of education: Not on file     Highest education level: Not on file   Occupational History     Not on file   Tobacco Use     Smoking status: Never     Smokeless tobacco: Never   Vaping Use     Vaping status: Never Used   Substance and Sexual Activity     Alcohol use: Yes     Alcohol/week: 0.0 standard drinks of alcohol     Drug use: No     Sexual activity: Not Currently   Other Topics Concern      Service Yes     Comment: 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 Not Asked   Social History Narrative     Not on file     Social Determinants of Health     Financial Resource Strain: Not on file   Food Insecurity: Not on file   Transportation Needs: Not on file   Physical Activity: Not on file   Stress: Not on file   Social  Connections: Not on file   Intimate Partner Violence: Not on file   Housing Stability: Not on file           EXAMINATION:  Gen:   No apparent distress  Neuro:   A&Ox3, no deficits  Psych:    Answering questions appropriately for age and situation with normal affect  Head:    NCAT  Eye:    Visual scanning without deficit  Ear:    Response to auditory stimuli wnl  Lung:    Non-labored breathing on RA noted  Abd:    NTND per patient report  Lymph:    Neg for pitting/non-pitting edema BLE  Vasc:    Pulses palpable, CFT minimally delayed  Neuro:    Light touch sensation intact to all sensory nerve distributions without paresthesias  Derm:    Neg for nodules, lesions or ulcerations  MSK:    Left lower extremity -no first MPJ or sesamoid pain.  He is tender at the second MPJ plantar plate, especially at the phalanx attachment point.  Lachman maneuver shows no pain or instability.  Flexor tendon is unremarkable  Calf:    Neg for redness, swelling or tenderness      Assessment:  58 year old male with left second MPJ capsulitis with plantar plate pain      Medical Decision Making/Plan:  Discussed etiologies, anatomy and options  1.  Left second MPJ capsulitis with plantar plate pain  -I personally reviewed and interpreted the patient's lower extremity history pertinent to today's visit, including imaging/labs, in preparation for initiating a treatment program.  -Our capsulitis handout was dispensed  -Advised comfortable shoes and minimizing shoeless walking based on symptoms  -OTC insert for arch support and stress relief and ligament  -Based on symptoms, a walking boot was dispensed.  The patient will be weightbearing as tolerated.  He is flying to [place] in the near future.  Advised taking it off during the flight as this is a blood clot risk  -RICE/NSAID versus Tylenol as needed based on pain  -He was given a prescription for Voltaren gel  -Consider Mobic, PT, x-ray/MRI    Regarding the CAM walker, the following  proper-usage instructions were discussed and dispensed:  1.  Do not sleep with the CAM boot on; 2.  Do not wear during long-distance travel, ie long car rides, plane trips(they were instructed not to drive with it if the involved foot is required to operate a vehicle); 3.  The patient was encouraged to remove the boot throughout the day when off their feet;  4.  They are to have the boot on when ambulating, regardless of WB status    Follow up:  3 weeks or sooner with acute issues      Patient's medical history was reviewed today      Carlos English DPM FACFAS FACFAOM  Podiatric Foot & Ankle Surgeon  [facility]  [phone]    Disclaimer: This note consists of symbols derived from keyboarding, dictation and/or voice recognition software. As a result, there may be errors in the script that have gone undetected. Please consider this when interpreting information found in this chart.

## 2023-04-21 NOTE — PATIENT INSTRUCTIONS
"Thank you for choosing [facility] Podiatry / Foot & Ankle Surgery!    DR. SALAZAR'S CLINIC LOCATIONS:     [facility] (Friday) TRIAGE LINE: [phone] [address] [facility]  APPOINTMENTS: [phone]   [place] [address] RADIOLOGY: [phone]    PHYSICAL THERAPY: [phone]    SET UP SURGERY: [phone]   [place] (Mon-Tues AM-Thurs) BILLING QUESTIONS: [phone] [address] FAX: [phone]   [place] [address]            PRICE Therapy    Many aches and pains throughout the foot and ankle can be helped with many simple treatments.  This is usually described as PRICE Therapy.      P - Protection - often times, inflammation/pain in the lower extremity is not able to improve simply because the areas involved are never allowed to rest.  Every step we take can bother the problematic area.  Protecting those areas is an important step in the healing process.  This may involve a walking cast boot, a special insert/orthotic device, an ankle brace, or simply avoiding barefoot walking.    R - Rest - in addition to protecting the foot/ankle, resting is an important, but often times difficult, treatment option.  Getting off your feet when they bother you, and specifically avoiding activities that cause pain/discomfort, are very beneficial to prevent, and treat, foot/ankle pain.  \"If there's something that makes it hurt(eg activities, shoe gear), and you keep doing the thing that makes it hurt, it's just going to keep hurting\".      I - Ice - icing regularly can help to decrease inflammation and swelling in the foot, thus decreasing pain.  Using an ice pack or a bag of frozen peas works very well.  Ice for 20 minutes multiple times per day as needed.  Do not place the ice directly on the skin as this can cause tissue damage.    C - Compression - using a compression wrap or an ACE wrap can help to decrease swelling, which can help to decrease pain.  Wearing the wraps is generally not " needed at night, but they should be worn on a regular basis when you are going to be on your feet for prolonged periods as gravity tends to pull fluids down to your feet/ankles.    E - Elevation - elevating your lower extremities multiple times daily for 15-20 minutes can help to decrease swelling, which works well in decreasing pain levels.      NSAID/Tylenol - An anti-inflammatory, like Aleve or ibuprofen, and/or a pain medication, such as Tylenol, can help to improve pain levels and get the issue resolved sooner rather than later.  Also, topical anti-inflammatory medications like Voltaren gel can be used for local treatment, with the benefit of avoiding system issues with oral medications.  Anyone with liver issues should be careful with Tylenol, and anyone with high blood pressure or heart, stomach or kidney issues should be careful with anti-inflammatories.  Please ask if you have questions about these medications, including dosage.        Over the Counter Inserts      Super Feet are the most common and easiest to find.  Locations include any [employer], [employer] in [place] on [address] [address] and in [place] on [address], [facility] in [place] on [address], [facility] Running Room in [place] on [address], [facility] Running Room in [place] on [address], [employer] in [place] on [address] and [employer] in [place] on [address] and in [place] on [address].    Spenco can be found online and at [employer] in [place] on [address], [employer] in [facility] on [address], [employer] in [place] on [address], [facility] in [place] on [address] and [employer] in [place] on [address].    Power Step can be a little harder to find.  Locations include [facility] on [address] in [place], [employer] in [place] on [address], [place] in [facility], [employer] in [place] on [address] and online    **  A  good high quality over the counter insert can cost around $30-$40.        CAPSULITIS / METATARSALGIA  All joints in the body are surrounded by a capsule, or a covering of soft tissue and ligaments. The capsule holds bones together and secretes joint fluid to help lubricate the joint. If a joint capsule is exposed to excessive force, it can develop microscopic tears and become inflamed. This commonly occurs in the foot due to mild variation in anatomy. Hammertoes, bunions, irregular bone length, joint immobility, etc. can all lead to excessive force on the joint. Capsule injury can also occur due to repetitive stress from exercise, insufficient support from shoes, excessive bare foot walking and excessive weight.      Conservative treatments include ice, rest from the aggravating activity, weight loss, orthotic inserts, improving shoes and shoe modifications. You can purchase an over the counter felt metatarsal pad to place in your shoes at [facility] or on [address]. Appropriate shoes will protect the inflamed tissue improving the chances of healing. Avoidance of standing or walking barefoot, including around the house, is necessary to allow healing. Casts are sometimes used for more aggressive protection.  NSAIDs such as Advil are also used to help with pain and decreasing inflammation. If pain continues over a period of weeks with continuous rest and icing, Corticosteroid injections can be a treatment option to try and help decrease inflammation.    Surgery is often necessary to correct the underlying structural problem. Surgery might include shortening an excessively long bone, repairing bunion or hammertoe, lengthening a tight Achilles  tendon, etc. These are same day surgeries that might be pursued if more conservative measures fail to provide relief.      The inflamed joint capsule has the potential to completely tear. This will allow the toe to drift off the ground, curving toward the other toes. The involved toe  may under or overlap the adjacent toes as drift continues. The pain may improve after the joint tears or this new position will be permanent. Surgery can address the toe alignment. Your goal of treating capsulitis is to avoid this scenario.        ** You can find felt metatarsal pads to place in your shoes at our [facility], [employer], [employer], or on [address] **

## 2023-05-10 ENCOUNTER — MYC MEDICAL ADVICE (OUTPATIENT)
Dept: PEDIATRICS | Facility: CLINIC | Age: 58
End: 2023-05-10
Payer: MEDICARE

## 2023-05-10 NOTE — TELEPHONE ENCOUNTER
"See pt's MC message. Called pt back to get an update.     Pt reiterated the info as in his MC message. Lightheadedness is much better now, almost gone. Has been pushing fluids. Denies confusion, blurry vision, nausea, vomiting, syncope or lethargy. Doesn't have BP monitor at home to check BP. [place]".     Advised pt to monitor closely for now. With any rapid worsening sx's, he will reach out to us. No other concerns at this time.     BP Readings from Last 4 Encounters:   04/21/23 128/78   03/27/23 (!) 154/86   06/02/21 (!) 152/94   03/24/21 (!) 146/90     KJ Victor  Patient Advocate Liason (PAL)  [facility]  Ph. [phone] / Fax. [phone]          "

## 2023-05-12 ENCOUNTER — MYC REFILL (OUTPATIENT)
Dept: PEDIATRICS | Facility: CLINIC | Age: 58
End: 2023-05-12
Payer: MEDICARE

## 2023-05-12 DIAGNOSIS — G58.8 PUDENDAL NEURALGIA: ICD-10-CM

## 2023-05-12 RX ORDER — HYDROCODONE BITARTRATE AND ACETAMINOPHEN 10; 325 MG/1; MG/1
1 TABLET ORAL 3 TIMES DAILY PRN
Qty: 60 TABLET | Refills: 0 | Status: SHIPPED | OUTPATIENT
Start: 2023-05-12 | End: 2023-06-12

## 2023-05-30 DIAGNOSIS — F51.01 PRIMARY INSOMNIA: ICD-10-CM

## 2023-05-30 RX ORDER — ZOLPIDEM TARTRATE 10 MG/1
TABLET ORAL
Qty: 30 TABLET | Refills: 5 | Status: SHIPPED | OUTPATIENT
Start: 2023-05-30 | End: 2023-11-19

## 2023-06-12 DIAGNOSIS — G58.8 PUDENDAL NEURALGIA: ICD-10-CM

## 2023-06-12 RX ORDER — HYDROCODONE BITARTRATE AND ACETAMINOPHEN 10; 325 MG/1; MG/1
1 TABLET ORAL 3 TIMES DAILY PRN
Qty: 60 TABLET | Refills: 0 | Status: SHIPPED | OUTPATIENT
Start: 2023-06-12 | End: 2023-07-11

## 2023-06-12 RX ORDER — HYDROCODONE BITARTRATE AND ACETAMINOPHEN 10; 325 MG/1; MG/1
TABLET ORAL
Qty: 60 TABLET | Refills: 0 | OUTPATIENT
Start: 2023-06-12

## 2023-07-11 ENCOUNTER — MYC REFILL (OUTPATIENT)
Dept: PEDIATRICS | Facility: CLINIC | Age: 58
End: 2023-07-11
Payer: MEDICARE

## 2023-07-11 DIAGNOSIS — G58.8 PUDENDAL NEURALGIA: ICD-10-CM

## 2023-07-11 RX ORDER — HYDROCODONE BITARTRATE AND ACETAMINOPHEN 10; 325 MG/1; MG/1
1 TABLET ORAL 3 TIMES DAILY PRN
Qty: 60 TABLET | Refills: 0 | Status: SHIPPED | OUTPATIENT
Start: 2023-07-11 | End: 2023-08-10

## 2023-08-08 ENCOUNTER — MYC REFILL (OUTPATIENT)
Dept: PEDIATRICS | Facility: CLINIC | Age: 58
End: 2023-08-08
Payer: MEDICARE

## 2023-08-08 DIAGNOSIS — G58.8 PUDENDAL NEURALGIA: ICD-10-CM

## 2023-08-10 ENCOUNTER — MYC REFILL (OUTPATIENT)
Dept: PEDIATRICS | Facility: CLINIC | Age: 58
End: 2023-08-10
Payer: MEDICARE

## 2023-08-10 DIAGNOSIS — G58.8 PUDENDAL NEURALGIA: ICD-10-CM

## 2023-08-10 RX ORDER — HYDROCODONE BITARTRATE AND ACETAMINOPHEN 10; 325 MG/1; MG/1
1 TABLET ORAL 3 TIMES DAILY PRN
Qty: 60 TABLET | Refills: 0 | Status: SHIPPED | OUTPATIENT
Start: 2023-08-10 | End: 2023-10-10

## 2023-08-10 NOTE — TELEPHONE ENCOUNTER
Routing refill request to provider for review/approval because:  Drug not on the [facility] refill protocol   Coco Hickey RN, BSN  [facility]

## 2023-08-11 RX ORDER — HYDROCODONE BITARTRATE AND ACETAMINOPHEN 10; 325 MG/1; MG/1
1 TABLET ORAL 3 TIMES DAILY PRN
Qty: 60 TABLET | Refills: 0 | Status: SHIPPED | OUTPATIENT
Start: 2023-08-11 | End: 2023-09-05

## 2023-08-11 RX ORDER — HYDROCODONE BITARTRATE AND ACETAMINOPHEN 10; 325 MG/1; MG/1
1 TABLET ORAL 3 TIMES DAILY PRN
Qty: 60 TABLET | Refills: 0 | OUTPATIENT
Start: 2023-08-11

## 2023-09-05 ENCOUNTER — MYC REFILL (OUTPATIENT)
Dept: PEDIATRICS | Facility: CLINIC | Age: 58
End: 2023-09-05
Payer: MEDICARE

## 2023-09-05 DIAGNOSIS — G58.8 PUDENDAL NEURALGIA: ICD-10-CM

## 2023-09-06 RX ORDER — HYDROCODONE BITARTRATE AND ACETAMINOPHEN 10; 325 MG/1; MG/1
1 TABLET ORAL 3 TIMES DAILY PRN
Qty: 60 TABLET | Refills: 0 | Status: SHIPPED | OUTPATIENT
Start: 2023-09-06 | End: 2023-10-05

## 2023-10-05 ENCOUNTER — MYC REFILL (OUTPATIENT)
Dept: PEDIATRICS | Facility: CLINIC | Age: 58
End: 2023-10-05
Payer: MEDICARE

## 2023-10-05 DIAGNOSIS — G58.8 PUDENDAL NEURALGIA: ICD-10-CM

## 2023-10-05 RX ORDER — HYDROCODONE BITARTRATE AND ACETAMINOPHEN 10; 325 MG/1; MG/1
1 TABLET ORAL 3 TIMES DAILY PRN
Qty: 60 TABLET | Refills: 0 | Status: SHIPPED | OUTPATIENT
Start: 2023-10-05 | End: 2023-11-09

## 2023-10-10 ENCOUNTER — MYC MEDICAL ADVICE (OUTPATIENT)
Dept: PEDIATRICS | Facility: CLINIC | Age: 58
End: 2023-10-10
Payer: MEDICARE

## 2023-10-10 DIAGNOSIS — G58.8 PUDENDAL NEURALGIA: ICD-10-CM

## 2023-10-10 NOTE — TELEPHONE ENCOUNTER
Patient sending [employer] message, was only given partial script for HYDROcodone-acetaminophen (NORCO)  MG per tablet. Usually receives #60 tablets, only reports receiving 22. Patient will need new script for remaining 38. Message sent to patient to confirm pharmacy.     Eduardo MARIN RN 10/10/2023 at 9:15 AM

## 2023-10-16 ENCOUNTER — MYC REFILL (OUTPATIENT)
Dept: PEDIATRICS | Facility: CLINIC | Age: 58
End: 2023-10-16
Payer: MEDICARE

## 2023-10-16 DIAGNOSIS — G58.8 PUDENDAL NEURALGIA: ICD-10-CM

## 2023-10-16 RX ORDER — HYDROCODONE BITARTRATE AND ACETAMINOPHEN 10; 325 MG/1; MG/1
1 TABLET ORAL 3 TIMES DAILY PRN
Qty: 38 TABLET | Refills: 0 | Status: SHIPPED | OUTPATIENT
Start: 2023-10-16 | End: 2023-10-18

## 2023-10-16 RX ORDER — HYDROCODONE BITARTRATE AND ACETAMINOPHEN 10; 325 MG/1; MG/1
1 TABLET ORAL 3 TIMES DAILY PRN
Qty: 60 TABLET | Refills: 0 | OUTPATIENT
Start: 2023-10-16

## 2023-10-18 ENCOUNTER — TELEPHONE (OUTPATIENT)
Dept: PEDIATRICS | Facility: CLINIC | Age: 58
End: 2023-10-18
Payer: MEDICARE

## 2023-10-18 DIAGNOSIS — G58.8 PUDENDAL NEURALGIA: ICD-10-CM

## 2023-10-18 RX ORDER — HYDROCODONE BITARTRATE AND ACETAMINOPHEN 10; 325 MG/1; MG/1
1-2 TABLET ORAL 3 TIMES DAILY PRN
Qty: 38 TABLET | Refills: 0 | Status: SHIPPED | OUTPATIENT
Start: 2023-10-18 | End: 2023-11-07

## 2023-10-18 RX ORDER — HYDROCODONE BITARTRATE AND ACETAMINOPHEN 5; 325 MG/1; MG/1
1-2 TABLET ORAL EVERY 6 HOURS PRN
Qty: 76 TABLET | Refills: 0 | Status: SHIPPED | OUTPATIENT
Start: 2023-10-18 | End: 2023-11-09

## 2023-10-18 NOTE — TELEPHONE ENCOUNTER
I sent a new prescription with sig:    Take 1-2 tablets by mouth 3 times daily as needed for severe pain     Can you confirm that the pharmacy got this rx?    (Pastora signed a prescription on Monday for 1 tab TID)

## 2023-10-18 NOTE — TELEPHONE ENCOUNTER
It has to be the 5mg Norco  They do not have the 10mg norco, due to shortage issues  See previous notes    aRnjith Oconnor RN on 10/18/2023 at 3:30 PM

## 2023-10-18 NOTE — TELEPHONE ENCOUNTER
Srinivas calling from [facility].  RX for 10/325 hydrocodone/Acet sent 10/5/23.  They did not have enough to fill #60 so they gave him #22 of 10/325 hydrocodone/acet.  They are unable to get more at this time due to nation wide shortage.  Srinivas requesting new RX be sent for the remainder of the 10/5/23 RX but for 5/325 hydrocod/acet and put directions as take 1-2 tabs 3 times a day as needed.  Taco is aware and fine with plan.  Srinivas states Taco said he often breaks in half anyway.  Please advise.  Enid Astorga RN

## 2023-10-18 NOTE — TELEPHONE ENCOUNTER
Dr. Strange,    Pharmacist called   They need an update script to be for norco 5mg 1-2 tabs due to supply issues  See previous message    Thank you  Ranjith Oconnor RN on 10/18/2023 at 1:34 PM

## 2023-11-07 ENCOUNTER — MYC REFILL (OUTPATIENT)
Dept: PEDIATRICS | Facility: CLINIC | Age: 58
End: 2023-11-07
Payer: MEDICARE

## 2023-11-07 DIAGNOSIS — G58.8 PUDENDAL NEURALGIA: ICD-10-CM

## 2023-11-08 RX ORDER — HYDROCODONE BITARTRATE AND ACETAMINOPHEN 10; 325 MG/1; MG/1
1-2 TABLET ORAL 3 TIMES DAILY PRN
Qty: 60 TABLET | Refills: 0 | Status: SHIPPED | OUTPATIENT
Start: 2023-11-08 | End: 2023-11-09

## 2023-11-09 ENCOUNTER — MYC MEDICAL ADVICE (OUTPATIENT)
Dept: PEDIATRICS | Facility: CLINIC | Age: 58
End: 2023-11-09
Payer: MEDICARE

## 2023-11-09 DIAGNOSIS — G58.8 PUDENDAL NEURALGIA: ICD-10-CM

## 2023-11-09 RX ORDER — HYDROCODONE BITARTRATE AND ACETAMINOPHEN 5; 325 MG/1; MG/1
1-2 TABLET ORAL EVERY 6 HOURS PRN
Qty: 120 TABLET | Refills: 0 | Status: SHIPPED | OUTPATIENT
Start: 2023-11-09 | End: 2023-12-07

## 2023-11-19 ENCOUNTER — MYC REFILL (OUTPATIENT)
Dept: PEDIATRICS | Facility: CLINIC | Age: 58
End: 2023-11-19
Payer: MEDICARE

## 2023-11-19 DIAGNOSIS — F51.01 PRIMARY INSOMNIA: ICD-10-CM

## 2023-11-20 RX ORDER — ZOLPIDEM TARTRATE 10 MG/1
TABLET ORAL
Qty: 30 TABLET | Refills: 5 | Status: SHIPPED | OUTPATIENT
Start: 2023-11-20 | End: 2024-06-12

## 2023-12-07 ENCOUNTER — MYC REFILL (OUTPATIENT)
Dept: PEDIATRICS | Facility: CLINIC | Age: 58
End: 2023-12-07
Payer: MEDICARE

## 2023-12-07 DIAGNOSIS — G58.8 PUDENDAL NEURALGIA: ICD-10-CM

## 2023-12-07 RX ORDER — HYDROCODONE BITARTRATE AND ACETAMINOPHEN 5; 325 MG/1; MG/1
1-2 TABLET ORAL EVERY 6 HOURS PRN
Qty: 120 TABLET | Refills: 0 | Status: SHIPPED | OUTPATIENT
Start: 2023-12-07 | End: 2024-01-04

## 2024-02-01 ENCOUNTER — MYC REFILL (OUTPATIENT)
Dept: PEDIATRICS | Facility: CLINIC | Age: 59
End: 2024-02-01
Payer: MEDICARE

## 2024-02-01 DIAGNOSIS — G58.8 PUDENDAL NEURALGIA: ICD-10-CM

## 2024-02-02 RX ORDER — HYDROCODONE BITARTRATE AND ACETAMINOPHEN 5; 325 MG/1; MG/1
1-2 TABLET ORAL EVERY 6 HOURS PRN
Qty: 120 TABLET | Refills: 0 | Status: SHIPPED | OUTPATIENT
Start: 2024-02-02 | End: 2024-02-29

## 2024-02-02 NOTE — TELEPHONE ENCOUNTER
reviewed  Last filled 1/4/24 for #120 tabs (has been given this amount since Nov)  Last visit with PCP 3/27/23  Has apt with PCP 3/11/24

## 2024-02-29 ENCOUNTER — MYC REFILL (OUTPATIENT)
Dept: PEDIATRICS | Facility: CLINIC | Age: 59
End: 2024-02-29
Payer: MEDICARE

## 2024-02-29 DIAGNOSIS — G58.8 PUDENDAL NEURALGIA: ICD-10-CM

## 2024-03-01 RX ORDER — HYDROCODONE BITARTRATE AND ACETAMINOPHEN 5; 325 MG/1; MG/1
1-2 TABLET ORAL EVERY 6 HOURS PRN
Qty: 120 TABLET | Refills: 0 | Status: SHIPPED | OUTPATIENT
Start: 2024-03-01 | End: 2024-03-31

## 2024-03-11 ENCOUNTER — OFFICE VISIT (OUTPATIENT)
Dept: PEDIATRICS | Facility: CLINIC | Age: 59
End: 2024-03-11
Payer: MEDICARE

## 2024-03-11 VITALS
HEART RATE: 57 BPM | TEMPERATURE: 96.7 F | DIASTOLIC BLOOD PRESSURE: 80 MMHG | WEIGHT: 154.1 LBS | BODY MASS INDEX: 24.77 KG/M2 | SYSTOLIC BLOOD PRESSURE: 140 MMHG | HEIGHT: 66 IN | RESPIRATION RATE: 18 BRPM | OXYGEN SATURATION: 99 %

## 2024-03-11 DIAGNOSIS — G58.8 PUDENDAL NEURALGIA: ICD-10-CM

## 2024-03-11 DIAGNOSIS — Z00.00 ROUTINE GENERAL MEDICAL EXAMINATION AT A HEALTH CARE FACILITY: Primary | ICD-10-CM

## 2024-03-11 DIAGNOSIS — Z12.11 SCREEN FOR COLON CANCER: ICD-10-CM

## 2024-03-11 DIAGNOSIS — Z12.5 SCREENING FOR PROSTATE CANCER: ICD-10-CM

## 2024-03-11 DIAGNOSIS — G89.4 CHRONIC PAIN SYNDROME: ICD-10-CM

## 2024-03-11 DIAGNOSIS — E78.5 HYPERLIPIDEMIA WITH TARGET LDL LESS THAN 130: ICD-10-CM

## 2024-03-11 PROCEDURE — G0103 PSA SCREENING: HCPCS | Performed by: INTERNAL MEDICINE

## 2024-03-11 PROCEDURE — 80053 COMPREHEN METABOLIC PANEL: CPT | Performed by: INTERNAL MEDICINE

## 2024-03-11 PROCEDURE — 80061 LIPID PANEL: CPT | Performed by: INTERNAL MEDICINE

## 2024-03-11 PROCEDURE — G0481 DRUG TEST DEF 8-14 CLASSES: HCPCS | Performed by: INTERNAL MEDICINE

## 2024-03-11 PROCEDURE — G0439 PPPS, SUBSEQ VISIT: HCPCS | Performed by: INTERNAL MEDICINE

## 2024-03-11 PROCEDURE — 36415 COLL VENOUS BLD VENIPUNCTURE: CPT | Performed by: INTERNAL MEDICINE

## 2024-03-11 SDOH — HEALTH STABILITY: PHYSICAL HEALTH: ON AVERAGE, HOW MANY DAYS PER WEEK DO YOU ENGAGE IN MODERATE TO STRENUOUS EXERCISE (LIKE A BRISK WALK)?: 4 DAYS

## 2024-03-11 ASSESSMENT — PAIN SCALES - GENERAL: PAINLEVEL: SEVERE PAIN (6)

## 2024-03-11 NOTE — PROGRESS NOTES
Preventive Care Visit  [facility]  Eriberto Strange MD, Internal Medicine - Pediatrics  Mar 11, 2024    Assessment & Plan       ICD-10-CM    1. 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 Chronic pain syndrome  G89.4 [id number] - Urine Drug Confirmation Panel (Comprehensive)     [id number] - Urine Drug Confirmation Panel (Comprehensive)      4. Hyperlipidemia with target LDL less than 130  E78.5 Lipid panel reflex to direct LDL Fasting     Lipid panel reflex to direct LDL Fasting      5. Screening for prostate cancer  Z12.5 Prostate Specific Antigen Screen     Prostate Specific Antigen Screen      6. Screen for colon cancer  Z12.11         Overall doing well.  No change w/ pudendal neuropathy/chronic pain. Updating UTox today for routine.  Fasting labwork ordered.  No change w/ medications today.  Appropriate screenings ordered.         Counseling  Appropriate preventive services were discussed with this patient, including applicable screening as appropriate for fall prevention, nutrition, physical activity, Tobacco-use cessation, weight loss and cognition.  Checklist reviewing preventive services available has been given to the patient.  Reviewed patient's diet, addressing concerns and/or questions.   The patient was instructed to see the dentist every 6 months.   He is at risk for psychosocial distress and has been provided with information to reduce risk.   I have reviewed Opioid Use Disorder and Substance Use Disorder risk factors and made any needed referrals.     Eriberto Strange MD      [facility] is a 58 year old, presenting for the following:  Medicare  Visit        3/11/2024    10:57 AM   Additional Questions   Roomed by Betty   Accompanied by daquan         3/11/2024    10:57 AM   Patient Reported Additional Medications   Patient reports taking the following new medications none       Health Care Directive  Patient does not have a Health Care Directive or Living Will: Discussed advance care planning with patient; however, patient declined at this time.    HPI    Here for AWV. Overall he is feeling well.    Chronic pudendal neuropathy/chronic pain. No significant change with his symptoms. Continues to need opiates to mitigate pain. Has worked with multiple different specialists in the past.    Hyperlipidemia.   Lab Results   Component Value Date     03/11/2024    LDL 52 10/09/2020    LDL 69 01/14/2016           3/11/2024   General Health   How would you rate your overall physical health? (!) FAIR   Feel stress (tense, anxious, or unable to sleep) Only a little   (!) STRESS CONCERN      3/11/2024   Nutrition   Diet: I don't know         3/11/2024   Exercise   Days per week of moderate/strenous exercise 4 days         3/11/2024   Social Factors   Worry food won't last until get money to buy more No   Food not last or not have enough money for food? No   Do you have housing?  Yes   Are you worried about losing your housing? No   Lack of transportation? No   Unable to get utilities (heat,electricity)? No         10/9/2020   Fall Risk   Fallen 2 or more times in the past year? No          3/11/2024   Activities of Daily Living- Home Safety   Needs help with the following daily activites None of the above   Safety concerns in the home None of the above         3/11/2024   Dental   Dentist two times every year? (!) NO         3/11/2024   Hearing Screening   Hearing concerns? None of the above         3/11/2024   Driving Risk Screening   Patient/family members have concerns about driving No         3/11/2024   General Alertness/Fatigue Screening   Have you been more  tired than usual lately? No         3/11/2024   Urinary Incontinence Screening   Bothered by leaking urine in past 6 months No            Today's PHQ-2 Score:       3/11/2024    10:47 AM   PHQ-2 ( 1999 Pfizer)   Q1: Little interest or pleasure in doing things 1   Q2: Feeling down, depressed or hopeless 1   PHQ-2 Score 2   Q1: Little interest or pleasure in doing things Several days   Q2: Feeling down, depressed or hopeless Several days   PHQ-2 Score 2           3/11/2024   Substance Use   Alcohol more than 3/day or more than 7/wk No   Do you have a current opioid prescription? (!) YES   How severe/bad is pain from 1 to 10? 6/10   Do you use any other substances recreationally? No       Social History     Tobacco Use    Smoking status: Never    Smokeless tobacco: Never   Vaping Use    Vaping Use: Never used   Substance Use Topics    Alcohol use: Yes     Alcohol/week: 0.0 standard drinks of alcohol    Drug use: No       Last PSA:   PSA   Date Value Ref Range Status   01/13/2014 0.90 0 - 4 ug/L Final     Current providers sharing in care for this patient include:  Patient Care Team:  Eriberto Strange MD as PCP - General  Eriberto Strange MD as Assigned PCP  Eriberto Strange MD as Assigned Pain Medication Provider  Carlos English DPM as Assigned Musculoskeletal Provider    The following health maintenance items are reviewed in Epic and correct as of today:  Health Maintenance   Topic Date Due    ANNUAL REVIEW OF  ORDERS  Never done    COLORECTAL CANCER SCREENING  Never done    DTAP/TDAP/TD IMMUNIZATION (1 - Tdap) 09/27/2005    ZOSTER IMMUNIZATION (1 of 2) Never done    MEDICARE ANNUAL WELLNESS VISIT  10/09/2021    URINE DRUG SCREEN  10/09/2021    RSV VACCINE (Pregnancy & 60+) (1 - 1-dose 60+ series) Never done    INFLUENZA VACCINE (1) 09/01/2023    COVID-19 Vaccine (2 - 2023-24 season) 09/01/2023    GLUCOSE  10/09/2023    LIPID  10/09/2025    ADVANCE CARE PLANNING  10/12/2025    HEPATITIS C SCREENING  Completed     "PHQ-2 (once per calendar year)  Completed    Pneumococcal Vaccine: Pediatrics (0 to 5 Years) and At-Risk Patients (6 to 64 Years)  Aged Out    IPV IMMUNIZATION  Aged Out    HPV IMMUNIZATION  Aged Out    MENINGITIS IMMUNIZATION  Aged Out    RSV MONOCLONAL ANTIBODY  Aged Out    HIV SCREENING  Discontinued          Objective    Exam  BP (!) 140/80 (BP Location: Right arm, Patient Position: Sitting, Cuff Size: Adult Regular)   Pulse 57   Temp (!) 96.7  F (35.9  C) (Tympanic)   Resp 18   Ht 1.664 m (5' 5.5\")   Wt 69.9 kg (154 lb 1.6 oz)   SpO2 99%   BMI 25.25 kg/m     Estimated body mass index is 25.25 kg/m  as calculated from the following:    Height as of this encounter: 1.664 m (5' 5.5\").    Weight as of this encounter: 69.9 kg (154 lb 1.6 oz).    Physical Exam  GENERAL: healthy, alert and no distress  EYES: PERRL, EOMI  HENT: ear canals and TM's normal. No nasal discharge. OP moist.  NECK: no adenopathy  RESP: lungs clear to auscultation - no rales, rhonchi or wheezes  CV: regular rate and rhythm, normal S1 S2, no murmur, no peripheral edema and peripheral pulses strong  ABDOMEN: soft, nontender, bowel sounds normal  MS: no gross musculoskeletal defects noted  SKIN: no suspicious lesions or rashes  NEURO: Normal strength and tone  PSYCH: mentation appears normal, affect normal/bright       3/11/2024   Mini Cog   Clock Draw Score 2 Normal   3 Item Recall 3 objects recalled   Mini Cog Total Score 5          Signed Electronically by: Eriberto Strange MD    "

## 2024-03-12 LAB
ALBUMIN SERPL BCG-MCNC: 5 G/DL (ref 3.5–5.2)
ALP SERPL-CCNC: 86 U/L (ref 40–150)
ALT SERPL W P-5'-P-CCNC: 32 U/L (ref 0–70)
ANION GAP SERPL CALCULATED.3IONS-SCNC: 11 MMOL/L (ref 7–15)
AST SERPL W P-5'-P-CCNC: 39 U/L (ref 0–45)
BILIRUB SERPL-MCNC: 1.4 MG/DL
BUN SERPL-MCNC: 18.7 MG/DL (ref 8–23)
CALCIUM SERPL-MCNC: 10.1 MG/DL (ref 8.8–10.2)
CHLORIDE SERPL-SCNC: 101 MMOL/L (ref 98–107)
CHOLEST SERPL-MCNC: 237 MG/DL
CREAT SERPL-MCNC: 1.49 MG/DL (ref 0.67–1.17)
CREAT UR-MCNC: 71 MG/DL
DEPRECATED HCO3 PLAS-SCNC: 27 MMOL/L (ref 22–29)
EGFRCR SERPLBLD CKD-EPI 2021: 53 ML/MIN/1.73M2
FASTING STATUS PATIENT QL REPORTED: ABNORMAL
GLUCOSE SERPL-MCNC: 100 MG/DL (ref 70–99)
HDLC SERPL-MCNC: 41 MG/DL
LDLC SERPL CALC-MCNC: 139 MG/DL
NONHDLC SERPL-MCNC: 196 MG/DL
POTASSIUM SERPL-SCNC: 4.8 MMOL/L (ref 3.4–5.3)
PROT SERPL-MCNC: 7.5 G/DL (ref 6.4–8.3)
PSA SERPL DL<=0.01 NG/ML-MCNC: 0.73 NG/ML (ref 0–4.5)
SODIUM SERPL-SCNC: 139 MMOL/L (ref 135–145)
TRIGL SERPL-MCNC: 283 MG/DL

## 2024-03-15 LAB
DHC UR CFM-MCNC: 494 NG/ML
DHC/CREAT UR: 696 NG/MG {CREAT}
HYDROCODONE UR CFM-MCNC: 1220 NG/ML
HYDROCODONE/CREAT UR: 1718 NG/MG {CREAT}
HYDROMORPHONE UR CFM-MCNC: 112 NG/ML
HYDROMORPHONE/CREAT UR: 158 NG/MG {CREAT}

## 2024-03-31 ENCOUNTER — MYC REFILL (OUTPATIENT)
Dept: PEDIATRICS | Facility: CLINIC | Age: 59
End: 2024-03-31
Payer: MEDICARE

## 2024-03-31 DIAGNOSIS — G58.8 PUDENDAL NEURALGIA: ICD-10-CM

## 2024-04-02 RX ORDER — HYDROCODONE BITARTRATE AND ACETAMINOPHEN 5; 325 MG/1; MG/1
1-2 TABLET ORAL EVERY 6 HOURS PRN
Qty: 120 TABLET | Refills: 0 | Status: SHIPPED | OUTPATIENT
Start: 2024-04-02 | End: 2024-04-29

## 2024-04-29 ENCOUNTER — MYC REFILL (OUTPATIENT)
Dept: PEDIATRICS | Facility: CLINIC | Age: 59
End: 2024-04-29
Payer: MEDICARE

## 2024-04-29 DIAGNOSIS — G58.8 PUDENDAL NEURALGIA: ICD-10-CM

## 2024-04-30 RX ORDER — HYDROCODONE BITARTRATE AND ACETAMINOPHEN 5; 325 MG/1; MG/1
1-2 TABLET ORAL EVERY 6 HOURS PRN
Qty: 120 TABLET | Refills: 0 | Status: SHIPPED | OUTPATIENT
Start: 2024-04-30 | End: 2024-05-29

## 2024-05-29 ENCOUNTER — MYC REFILL (OUTPATIENT)
Dept: PEDIATRICS | Facility: CLINIC | Age: 59
End: 2024-05-29
Payer: MEDICARE

## 2024-05-29 DIAGNOSIS — G58.8 PUDENDAL NEURALGIA: ICD-10-CM

## 2024-05-29 RX ORDER — HYDROCODONE BITARTRATE AND ACETAMINOPHEN 5; 325 MG/1; MG/1
1-2 TABLET ORAL EVERY 6 HOURS PRN
Qty: 120 TABLET | Refills: 0 | Status: SHIPPED | OUTPATIENT
Start: 2024-05-29 | End: 2024-06-26

## 2024-06-12 ENCOUNTER — MYC REFILL (OUTPATIENT)
Dept: PEDIATRICS | Facility: CLINIC | Age: 59
End: 2024-06-12
Payer: MEDICARE

## 2024-06-12 DIAGNOSIS — F51.01 PRIMARY INSOMNIA: ICD-10-CM

## 2024-06-12 RX ORDER — ZOLPIDEM TARTRATE 10 MG/1
TABLET ORAL
Qty: 30 TABLET | Refills: 5 | Status: SHIPPED | OUTPATIENT
Start: 2024-06-12

## 2024-06-26 ENCOUNTER — MYC REFILL (OUTPATIENT)
Dept: PEDIATRICS | Facility: CLINIC | Age: 59
End: 2024-06-26
Payer: MEDICARE

## 2024-06-26 DIAGNOSIS — G58.8 PUDENDAL NEURALGIA: ICD-10-CM

## 2024-06-27 RX ORDER — HYDROCODONE BITARTRATE AND ACETAMINOPHEN 5; 325 MG/1; MG/1
1-2 TABLET ORAL EVERY 6 HOURS PRN
Qty: 120 TABLET | Refills: 0 | Status: SHIPPED | OUTPATIENT
Start: 2024-06-27 | End: 2024-07-24

## 2024-07-24 ENCOUNTER — MYC REFILL (OUTPATIENT)
Dept: PEDIATRICS | Facility: CLINIC | Age: 59
End: 2024-07-24
Payer: MEDICARE

## 2024-07-24 DIAGNOSIS — G58.8 PUDENDAL NEURALGIA: ICD-10-CM

## 2024-07-24 RX ORDER — HYDROCODONE BITARTRATE AND ACETAMINOPHEN 5; 325 MG/1; MG/1
1-2 TABLET ORAL EVERY 6 HOURS PRN
Qty: 120 TABLET | Refills: 0 | Status: SHIPPED | OUTPATIENT
Start: 2024-07-24 | End: 2024-08-25

## 2024-08-25 ENCOUNTER — MYC REFILL (OUTPATIENT)
Dept: PEDIATRICS | Facility: CLINIC | Age: 59
End: 2024-08-25
Payer: MEDICARE

## 2024-08-25 DIAGNOSIS — G58.8 PUDENDAL NEURALGIA: ICD-10-CM

## 2024-08-26 RX ORDER — HYDROCODONE BITARTRATE AND ACETAMINOPHEN 5; 325 MG/1; MG/1
1-2 TABLET ORAL EVERY 6 HOURS PRN
Qty: 120 TABLET | Refills: 0 | Status: SHIPPED | OUTPATIENT
Start: 2024-08-26 | End: 2024-08-27

## 2024-08-27 RX ORDER — HYDROCODONE BITARTRATE AND ACETAMINOPHEN 5; 325 MG/1; MG/1
1-2 TABLET ORAL EVERY 6 HOURS PRN
Qty: 120 TABLET | Refills: 0 | Status: SHIPPED | OUTPATIENT
Start: 2024-08-27 | End: 2024-09-23

## 2024-08-27 NOTE — TELEPHONE ENCOUNTER
Pt calls stating [facility] in [facility] does not have Norco in stock. Pt is asking that rx be resent to [facility] in [place]. Routing to provider to advise. Pt is out of medication.    Maddie Jefferson RN on 8/27/2024 at 1:44 PM

## 2024-09-23 ENCOUNTER — MYC REFILL (OUTPATIENT)
Dept: PEDIATRICS | Facility: CLINIC | Age: 59
End: 2024-09-23
Payer: MEDICARE

## 2024-09-23 DIAGNOSIS — G58.8 PUDENDAL NEURALGIA: ICD-10-CM

## 2024-09-23 RX ORDER — HYDROCODONE BITARTRATE AND ACETAMINOPHEN 5; 325 MG/1; MG/1
1-2 TABLET ORAL EVERY 6 HOURS PRN
Qty: 120 TABLET | Refills: 0 | Status: SHIPPED | OUTPATIENT
Start: 2024-09-23

## 2024-10-21 ENCOUNTER — MYC REFILL (OUTPATIENT)
Dept: PEDIATRICS | Facility: CLINIC | Age: 59
End: 2024-10-21
Payer: MEDICARE

## 2024-10-21 DIAGNOSIS — G58.8 PUDENDAL NEURALGIA: ICD-10-CM

## 2024-10-22 RX ORDER — HYDROCODONE BITARTRATE AND ACETAMINOPHEN 5; 325 MG/1; MG/1
1-2 TABLET ORAL EVERY 6 HOURS PRN
Qty: 120 TABLET | Refills: 0 | Status: SHIPPED | OUTPATIENT
Start: 2024-10-22

## 2024-11-07 ENCOUNTER — MYC REFILL (OUTPATIENT)
Dept: PEDIATRICS | Facility: CLINIC | Age: 59
End: 2024-11-07
Payer: MEDICARE

## 2024-11-07 DIAGNOSIS — F51.01 PRIMARY INSOMNIA: ICD-10-CM

## 2024-11-07 RX ORDER — ZOLPIDEM TARTRATE 10 MG/1
TABLET ORAL
Qty: 30 TABLET | Refills: 5 | Status: SHIPPED | OUTPATIENT
Start: 2024-11-07

## 2024-11-19 ENCOUNTER — MYC REFILL (OUTPATIENT)
Dept: PEDIATRICS | Facility: CLINIC | Age: 59
End: 2024-11-19
Payer: MEDICARE

## 2024-11-19 DIAGNOSIS — G58.8 PUDENDAL NEURALGIA: ICD-10-CM

## 2024-11-19 RX ORDER — HYDROCODONE BITARTRATE AND ACETAMINOPHEN 5; 325 MG/1; MG/1
1-2 TABLET ORAL EVERY 6 HOURS PRN
Qty: 120 TABLET | Refills: 0 | Status: SHIPPED | OUTPATIENT
Start: 2024-11-19

## 2024-12-17 ENCOUNTER — MYC REFILL (OUTPATIENT)
Dept: PEDIATRICS | Facility: CLINIC | Age: 59
End: 2024-12-17
Payer: MEDICARE

## 2024-12-17 DIAGNOSIS — G58.8 PUDENDAL NEURALGIA: ICD-10-CM

## 2024-12-17 RX ORDER — HYDROCODONE BITARTRATE AND ACETAMINOPHEN 5; 325 MG/1; MG/1
1-2 TABLET ORAL EVERY 6 HOURS PRN
Qty: 120 TABLET | Refills: 0 | Status: SHIPPED | OUTPATIENT
Start: 2024-12-17

## 2025-02-10 ENCOUNTER — PATIENT OUTREACH (OUTPATIENT)
Dept: CARE COORDINATION | Facility: CLINIC | Age: 60
End: 2025-02-10
Payer: MEDICARE

## 2025-02-17 ENCOUNTER — MYC REFILL (OUTPATIENT)
Dept: PEDIATRICS | Facility: CLINIC | Age: 60
End: 2025-02-17
Payer: MEDICARE

## 2025-02-17 DIAGNOSIS — G58.8 PUDENDAL NEURALGIA: ICD-10-CM

## 2025-02-17 RX ORDER — HYDROCODONE BITARTRATE AND ACETAMINOPHEN 5; 325 MG/1; MG/1
1-2 TABLET ORAL EVERY 6 HOURS PRN
Qty: 120 TABLET | Refills: 0 | Status: SHIPPED | OUTPATIENT
Start: 2025-02-17

## 2025-02-17 NOTE — TELEPHONE ENCOUNTER
Patient needs annual appointment in March/April. Last appointment 3/11/24.   Yoselin Maddox PA-C

## 2025-02-18 ENCOUNTER — MYC REFILL (OUTPATIENT)
Dept: PEDIATRICS | Facility: CLINIC | Age: 60
End: 2025-02-18
Payer: MEDICARE

## 2025-02-18 ENCOUNTER — MYC MEDICAL ADVICE (OUTPATIENT)
Dept: PEDIATRICS | Facility: CLINIC | Age: 60
End: 2025-02-18
Payer: MEDICARE

## 2025-02-18 DIAGNOSIS — G58.8 PUDENDAL NEURALGIA: ICD-10-CM

## 2025-02-18 RX ORDER — HYDROCODONE BITARTRATE AND ACETAMINOPHEN 5; 325 MG/1; MG/1
1-2 TABLET ORAL EVERY 6 HOURS PRN
Qty: 120 TABLET | Refills: 0 | OUTPATIENT
Start: 2025-02-18

## 2025-02-18 NOTE — TELEPHONE ENCOUNTER
Sent a [employer] message to the patient   - Informed the patient that a prescription for his HYDROcodone-acetaminophen (NORCO) 5-325 MG tablet [facility] [place] [place] [facility] [address] yesterday (2/17/2025)   - Patient is scheduled for his annual preventative visit with Dr. Strange on 4/7/2025     HYDROcodone-acetaminophen (NORCO) 5-325 MG tablet 120 tablet 0 2/17/2025 -- No   Sig - Route: Take 1-2 tablets by mouth every 6 hours as needed for pain. - Oral     Appointments in Next Year      Apr 07, 2025 2:00 PM  (Arrive by 1:40 PM)  Adult Preventative Visit with Eriberto Strange MD  [facility] [place] ([facility] [place] ) [phone]     Snow ALEX RN   [facility]

## 2025-03-18 ENCOUNTER — MYC REFILL (OUTPATIENT)
Dept: PEDIATRICS | Facility: CLINIC | Age: 60
End: 2025-03-18
Payer: MEDICARE

## 2025-03-18 DIAGNOSIS — G58.8 PUDENDAL NEURALGIA: ICD-10-CM

## 2025-03-18 RX ORDER — HYDROCODONE BITARTRATE AND ACETAMINOPHEN 5; 325 MG/1; MG/1
1-2 TABLET ORAL EVERY 6 HOURS PRN
Qty: 120 TABLET | Refills: 0 | Status: SHIPPED | OUTPATIENT
Start: 2025-03-18

## 2025-03-25 ENCOUNTER — PATIENT OUTREACH (OUTPATIENT)
Dept: CARE COORDINATION | Facility: CLINIC | Age: 60
End: 2025-03-25
Payer: MEDICARE

## 2025-04-07 ENCOUNTER — OFFICE VISIT (OUTPATIENT)
Dept: PEDIATRICS | Facility: CLINIC | Age: 60
End: 2025-04-07
Payer: MEDICARE

## 2025-04-07 VITALS
HEART RATE: 61 BPM | TEMPERATURE: 97.6 F | DIASTOLIC BLOOD PRESSURE: 95 MMHG | RESPIRATION RATE: 16 BRPM | HEIGHT: 66 IN | WEIGHT: 161.6 LBS | SYSTOLIC BLOOD PRESSURE: 150 MMHG | OXYGEN SATURATION: 98 % | BODY MASS INDEX: 25.97 KG/M2

## 2025-04-07 DIAGNOSIS — F41.1 GENERALIZED ANXIETY DISORDER: ICD-10-CM

## 2025-04-07 DIAGNOSIS — Z00.00 ANNUAL PHYSICAL EXAM: Primary | ICD-10-CM

## 2025-04-07 DIAGNOSIS — J31.0 RHINITIS, UNSPECIFIED TYPE: ICD-10-CM

## 2025-04-07 DIAGNOSIS — G58.8 PUDENDAL NEURALGIA: ICD-10-CM

## 2025-04-07 DIAGNOSIS — Z12.11 SCREEN FOR COLON CANCER: ICD-10-CM

## 2025-04-07 DIAGNOSIS — Z79.891 CHRONIC PRESCRIPTION OPIATE USE: ICD-10-CM

## 2025-04-07 DIAGNOSIS — F51.01 PRIMARY INSOMNIA: ICD-10-CM

## 2025-04-07 LAB
ANION GAP SERPL CALCULATED.3IONS-SCNC: 12 MMOL/L (ref 7–15)
BUN SERPL-MCNC: 13 MG/DL (ref 8–23)
CALCIUM SERPL-MCNC: 9.8 MG/DL (ref 8.8–10.4)
CHLORIDE SERPL-SCNC: 102 MMOL/L (ref 98–107)
CHOLEST SERPL-MCNC: 212 MG/DL
CREAT SERPL-MCNC: 1.48 MG/DL (ref 0.67–1.17)
CREAT UR-MCNC: 26 MG/DL
EGFRCR SERPLBLD CKD-EPI 2021: 53 ML/MIN/1.73M2
FASTING STATUS PATIENT QL REPORTED: YES
FASTING STATUS PATIENT QL REPORTED: YES
GLUCOSE SERPL-MCNC: 97 MG/DL (ref 70–99)
HCO3 SERPL-SCNC: 25 MMOL/L (ref 22–29)
HDLC SERPL-MCNC: 38 MG/DL
LDLC SERPL CALC-MCNC: 137 MG/DL
NONHDLC SERPL-MCNC: 174 MG/DL
POTASSIUM SERPL-SCNC: 5 MMOL/L (ref 3.4–5.3)
SODIUM SERPL-SCNC: 139 MMOL/L (ref 135–145)
TRIGL SERPL-MCNC: 185 MG/DL

## 2025-04-07 PROCEDURE — 36415 COLL VENOUS BLD VENIPUNCTURE: CPT | Performed by: INTERNAL MEDICINE

## 2025-04-07 PROCEDURE — 80061 LIPID PANEL: CPT | Performed by: INTERNAL MEDICINE

## 2025-04-07 PROCEDURE — G0482 DRUG TEST DEF 15-21 CLASSES: HCPCS | Performed by: INTERNAL MEDICINE

## 2025-04-07 PROCEDURE — 80048 BASIC METABOLIC PNL TOTAL CA: CPT | Performed by: INTERNAL MEDICINE

## 2025-04-07 RX ORDER — ZOLPIDEM TARTRATE 10 MG/1
TABLET ORAL
Qty: 30 TABLET | Refills: 5 | Status: SHIPPED | OUTPATIENT
Start: 2025-04-07

## 2025-04-07 RX ORDER — FLUTICASONE PROPIONATE 50 MCG
1 SPRAY, SUSPENSION (ML) NASAL DAILY
Qty: 48 G | Refills: 3 | Status: SHIPPED | OUTPATIENT
Start: 2025-04-07

## 2025-04-07 RX ORDER — HYDROCODONE BITARTRATE AND ACETAMINOPHEN 5; 325 MG/1; MG/1
1-2 TABLET ORAL EVERY 6 HOURS PRN
Qty: 120 TABLET | Refills: 0 | Status: SHIPPED | OUTPATIENT
Start: 2025-04-15

## 2025-04-07 SDOH — HEALTH STABILITY: PHYSICAL HEALTH: ON AVERAGE, HOW MANY DAYS PER WEEK DO YOU ENGAGE IN MODERATE TO STRENUOUS EXERCISE (LIKE A BRISK WALK)?: 3 DAYS

## 2025-04-07 ASSESSMENT — PATIENT HEALTH QUESTIONNAIRE - PHQ9
SUM OF ALL RESPONSES TO PHQ QUESTIONS 1-9: 5
10. IF YOU CHECKED OFF ANY PROBLEMS, HOW DIFFICULT HAVE THESE PROBLEMS MADE IT FOR YOU TO DO YOUR WORK, TAKE CARE OF THINGS AT HOME, OR GET ALONG WITH OTHER PEOPLE: NOT DIFFICULT AT ALL
SUM OF ALL RESPONSES TO PHQ QUESTIONS 1-9: 5

## 2025-04-07 ASSESSMENT — SOCIAL DETERMINANTS OF HEALTH (SDOH): HOW OFTEN DO YOU GET TOGETHER WITH FRIENDS OR RELATIVES?: ONCE A WEEK

## 2025-04-07 NOTE — PROGRESS NOTES
"Pre[facility]ive Care Visit  [facility] Internal Medicine - [facility]s  Apr 7, 2025      Assessment & Plan       ICD-10-CM    1. Annual physical exam  Z00.00 Basic metabolic panel     Lipid panel reflex to direct LDL Fasting     Basic metabolic panel     Lipid panel reflex to direct LDL Fasting      2. GENERALIZED ANXIETY DIS  F41.1       3. Insomnia  F51.01 zolpidem (AMBIEN) 10 MG tablet      4. 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 Rhinitis, unspecified type  J31.0 fluticasone (FLONASE) 50 MCG/ACT nasal spray      6. Chronic prescription opiate use  Z79.891 Drug Confirmation Panel Urine with Creat - lab collect     Drug Confirmation Panel Urine with Creat - lab collect      7. Screen for colon cancer  Z12.11 COLQUENTINUARANJANA([employer])        Taco is here today for his annual wellness examination.  Overall he is doing well.  We reviewed health maintenance and updated as we are able.  Discussed colon cancer screening options.  Cologuard was ordered.    Chronic medical issues were reviewed as well.    Anxiety and insomnia.  Overall his mood has been doing fairly well.  Sleep is going fairly well also.    Pudendal neuralgia with chronic opiate use.  We reviewed his opiate use and encouraged decreased use as he is able.  He has been spreading out doses which is very good to hear.  Will update urine toxicology screen today as a routine screen.    Chronic rhinitis.  Refilled Flonase.        BMI  Estimated body mass index is 26.48 kg/m  as calculated from the following:    Height as of this encounter: 1.664 m (5' 5.5\").    Weight as of this encounter: 73.3 kg (161 lb 9.6 oz).       Counseling  Appropriate preventive services were addressed with this patient via screening, questionnaire, or discussion as appropriate for fall " prevention, nutrition, physical activity, Tobacco-use cessation, social engagement, weight loss and cognition.  Checklist reviewing preventive services available has been given to the patient.  Reviewed patient's diet, addressing concerns and/or questions.   He is at risk for lack of exercise and has been provided with information to increase physical activity for the benefit of his well-being.   The patient was instructed to see the dentist every 6 months.   The patient's PHQ-9 score is consistent with mild depression. He was provided with information regarding depression.   I have reviewed Opioid Use Disorder and Substance Use Disorder risk factors and made any needed referrals.       Eriberto Strange MD      Lakshmi España is a 59 year old, presenting for the following:  Wellness Visit        4/7/2025     1:38 PM   Additional Questions   Roomed by Юлия Ovalles     Pt would like to recheck cholestrol    HPI  Taco is here today for his annual wellness visit.  Overall he is doing well.  Reviewed his health maintenance as well as his ongoing medical issues.     Having pain in the left foot, first metatarsal. Being managed by podiatry in his local area.    Right knee pain. Managed by [facility].  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    Ongoing chronic issue w/ pelvic pain.  Diagnosis is pudendal neuralgia.  This is chronic in nature.  Has required opiates to treat.  He is working toward reducing his overall opiate usage.  We reviewed his prescriptions.  Things are on track as far as that goes.    Chronic anxiety and insomnia.  Overall he feels his mood is doing fairly well.  We reviewed management options.    Chronic rhinitis.  Would like to refill his Flonase prescription.    Advance Care Planning  Patient does not have a Health Care Directive: Discussed advance care planning with patient; however,  patient declined at this time.      4/7/2025   General Health   How would you rate your overall physical health? (!) FAIR         4/7/2025   Nutrition   Diet: Low fat/cholesterol         4/7/2025   Exercise   Days per week of moderate/strenous exercise 3 days         4/7/2025   Social Factors   Frequency of gathering with friends or relatives Once a week   Worry food won't last until get money to buy more Patient declined   Food not last or not have enough money for food? Patient declined   Do you have housing? (Housing is defined as stable permanent housing and does not include staying ouside in a car, in a tent, in an abandoned building, in an overnight shelter, or couch-surfing.) Yes   Are you worried about losing your housing? No   Lack of transportation? No   Unable to get utilities (heat,electricity)? No         4/7/2025   Fall Risk   Fallen 2 or more times in the past year? No   Trouble with walking or balance? No          4/7/2025   Activities of Daily Living- Home Safety   Needs help with the following daily activites None of the above   Safety concerns in the home None of the above         4/7/2025   Dental   Dentist two times every year? (!) NO         4/7/2025   Hearing Screening   Hearing concerns? None of the above         4/7/2025   Driving Risk Screening   Patient/family members have concerns about driving No         4/7/2025   General Alertness/Fatigue Screening   Have you been more tired than usual lately? No         4/7/2025   Urinary Incontinence Screening   Bothered by leaking urine in past 6 months No     Today's PHQ-9 Score:       4/7/2025     1:33 PM   PHQ-9 SCORE   PHQ-9 Total Score MyChart 5 (Mild depression)   PHQ-9 Total Score 5        Patient-reported         4/7/2025   Substance Use   Alcohol more than 3/day or more than 7/wk No   Do you have a current opioid prescription? (!) YES   How severe/bad is pain from 1 to 10? 6/10   Do you use any other substances recreationally? No         Social History     Tobacco Use    Smoking status: Never    Smokeless tobacco: Never   Vaping Use    Vaping status: Never Used   Substance Use Topics    Alcohol use: Yes     Alcohol/week: 0.0 standard drinks of alcohol    Drug use: No       Last PSA:   PSA   Date Value Ref Range Status   01/13/2014 0.90 0 - 4 ug/L Final     Prostate Specific Antigen Screen   Date Value Ref Range Status   03/11/2024 0.73 0.00 - 4.50 ng/mL Final     ASCVD Risk   The 10-year ASCVD risk score (Marie SAAVEDRA, et al., 2019) is: 16%    Values used to calculate the score:      Age: 59 years      Sex: Male      Is Non- : No      Diabetic: No      Tobacco smoker: No      Systolic Blood Pressure: 150 mmHg      Is BP treated: No      HDL Cholesterol: 41 mg/dL      Total Cholesterol: 237 mg/dL    Current providers sharing in care for this patient include:  Patient Care Team:  Eriberto Strange MD as PCP - General  Eriberto Strange MD as Assigned PCP  Eriberto Strange MD as Assigned Pain Medication Provider    The following health maintenance items are reviewed in Epic and correct as of today:  Health Maintenance   Topic Date Due    ANNUAL REVIEW OF HM ORDERS  Never done    COLORECTAL CANCER SCREENING  Never done    DTAP/TDAP/TD IMMUNIZATION (1 - Tdap) 09/27/2005    Pneumococcal Vaccine: 50+ Years (1 of 1 - PCV) Never done    ZOSTER IMMUNIZATION (1 of 2) Never done    INFLUENZA VACCINE (1) 09/01/2024    URINE DRUG SCREEN  03/11/2025    MEDICARE ANNUAL WELLNESS VISIT  03/11/2025    DIABETES SCREENING  03/11/2027    LIPID  03/11/2029    ADVANCE CARE PLANNING  03/11/2029    RSV VACCINE (1 - 1-dose 75+ series) 04/08/2038    HEPATITIS C SCREENING  Completed    PHQ-2 (once per calendar year)  Completed    HPV IMMUNIZATION  Aged Out    MENINGITIS IMMUNIZATION  Aged Out    HIV SCREENING  Discontinued    COVID-19 Vaccine  Discontinued            Objective    Exam  BP (!) 150/95 (BP Location: Right arm, Patient Position:  "Sitting, Cuff Size: Adult Regular)   Pulse 61   Temp 97.6  F (36.4  C) (Temporal)   Resp 16   Ht 1.664 m (5' 5.5\")   Wt 73.3 kg (161 lb 9.6 oz)   SpO2 98%   BMI 26.48 kg/m     Estimated body mass index is 26.48 kg/m  as calculated from the following:    Height as of this encounter: 1.664 m (5' 5.5\").    Weight as of this encounter: 73.3 kg (161 lb 9.6 oz).    Physical Exam  GENERAL: healthy, alert and no distress  EYES: PERRL, EOMI  HENT: ear canals and TM's normal. No nasal discharge. OP moist.  NECK: no adenopathy  RESP: lungs clear to auscultation - no rales, rhonchi or wheezes  CV: regular rate and rhythm, normal S1 S2, no murmur, no peripheral edema  ABDOMEN: soft, nontender, bowel sounds normal  SKIN: no suspicious lesions or rashes  NEURO: Normal strength and tone  PSYCH: mentation appears normal, affect normal          4/7/2025   Mini Cog   Clock Draw Score 2 Normal   3 Item Recall 3 objects recalled   Mini Cog Total Score 5              Signed Electronically by: Eriberto Strange MD    "

## 2025-04-09 LAB
DHC UR CFM-MCNC: 149 NG/ML
DHC/CREAT UR: 573 NG/MG {CREAT}
HYDROCODONE UR CFM-MCNC: 311 NG/ML
HYDROCODONE/CREAT UR: 1196 NG/MG {CREAT}
HYDROMORPHONE UR CFM-MCNC: 54 NG/ML
HYDROMORPHONE/CREAT UR: 208 NG/MG {CREAT}

## 2025-05-06 ENCOUNTER — NURSE TRIAGE (OUTPATIENT)
Dept: PEDIATRICS | Facility: CLINIC | Age: 60
End: 2025-05-06
Payer: MEDICARE

## 2025-05-06 DIAGNOSIS — E78.5 HYPERLIPIDEMIA WITH TARGET LDL LESS THAN 130: ICD-10-CM

## 2025-05-06 RX ORDER — SIMVASTATIN 20 MG
TABLET ORAL
Qty: 90 TABLET | Refills: 3 | Status: SHIPPED | OUTPATIENT
Start: 2025-05-06

## 2025-05-06 NOTE — TELEPHONE ENCOUNTER
Attempted to call patient, unable to leave voicemail as mailbox is full.     When patient returns call, triage symptoms. Discuss colonoscopy referral/cologuard request.     -RN created refill encounter request, see next refill encounter.     WILY FreemanN, RN  [facility]

## 2025-05-06 NOTE — TELEPHONE ENCOUNTER
Patient requests refill of simvastatin, routing to refill pool to process.     WILY FreemanN, RN  [facility]

## 2025-05-07 NOTE — TELEPHONE ENCOUNTER
Attempted to reach patient, unable to leave voicemail as mailbox is not set up. [employer] message sent at this time.     Eduardo MARIN RN 5/7/2025 at 2:28 PM

## 2025-05-08 NOTE — TELEPHONE ENCOUNTER
"S: Blood in bowel movement   B: history of hemorhoids. Started 3d ago.  A: Bright red blood in the water of toilet. Bowel movement is formed and soft. Yesterday there was no blood until he wiped himself \"and there was redness on the paper\".    Today had a soft bowel movement and saw significant amount of blood in the toilet without wiping. Noticed some black dots in the toilet as well.    R: Emergency Department per protocol.  Patient was given an opportunity to ask questions, verbalized understanding of plan, and is agreeable.     Nehal Pearl RN       Reason for Disposition   Bloody, black, or tarry bowel movements  (Exception: Chronic-unchanged black-grey bowel movements and is taking iron pills or Pepto-Bismol.)    Additional Information   Negative: Passed out (e.g., fainted, lost consciousness, blacked out and was not responding)   Negative: Shock suspected (e.g., cold/pale/clammy skin, too weak to stand, low BP, rapid pulse)   Negative: Vomiting red blood or black (coffee ground) material   Negative: Sounds like a life-threatening emergency to the triager   Negative: Diarrhea is main symptom   Negative: Rectal symptoms   Negative: SEVERE dizziness (e.g., unable to stand, requires support to walk, feels like passing out now)   Negative: MODERATE rectal bleeding (small blood clots, passing blood without stool, or toilet water turns red) more than once a day   Negative: SEVERE rectal bleeding (large blood clots; constant or on and off bleeding)    Protocols used: Rectal Bleeding-A-[place]    "

## 2025-05-21 ENCOUNTER — MYC REFILL (OUTPATIENT)
Dept: PEDIATRICS | Facility: CLINIC | Age: 60
End: 2025-05-21
Payer: MEDICARE

## 2025-05-21 DIAGNOSIS — G58.8 PUDENDAL NEURALGIA: ICD-10-CM

## 2025-05-21 RX ORDER — HYDROCODONE BITARTRATE AND ACETAMINOPHEN 5; 325 MG/1; MG/1
1-2 TABLET ORAL EVERY 6 HOURS PRN
Qty: 120 TABLET | Refills: 0 | Status: SHIPPED | OUTPATIENT
Start: 2025-05-21

## 2025-06-24 ENCOUNTER — MYC REFILL (OUTPATIENT)
Dept: PEDIATRICS | Facility: CLINIC | Age: 60
End: 2025-06-24
Payer: MEDICARE

## 2025-06-24 DIAGNOSIS — G58.8 PUDENDAL NEURALGIA: ICD-10-CM

## 2025-06-24 RX ORDER — HYDROCODONE BITARTRATE AND ACETAMINOPHEN 5; 325 MG/1; MG/1
1-2 TABLET ORAL EVERY 6 HOURS PRN
Qty: 120 TABLET | Refills: 0 | Status: SHIPPED | OUTPATIENT
Start: 2025-06-24

## 2025-07-12 RX ORDER — METOPROLOL SUCCINATE 25 MG/1
25 TABLET, EXTENDED RELEASE ORAL DAILY
Status: CANCELLED | OUTPATIENT
Start: 2025-07-12

## 2025-07-14 NOTE — TELEPHONE ENCOUNTER
Clinic RN: Please investigate patient's chart or contact patient if the information cannot be found because the medication is listed as historical or discontinued. Confirm patient is taking this medication. Document findings and route refill encounter to provider for approval or denial.    metoprolol succinate ER (TOPROL XL) 25 MG 24 hr tablet     Tisha Pennington RN on 7/14/2025 at 10:30 AM

## 2025-07-14 NOTE — TELEPHONE ENCOUNTER
RN called and spoke with patient. Patient's phone call dropped mid-conversation after patient stated he gets bad reception.     RN attempted to reach patient back. Left voicemail to call back and speak with a nurse.     When patient  calls back:  - previously prescribed by cardiology. Is patient able to reach out to them for refill? Is he requesting Dr Strange take over? Might need visit.       Kasia Knight RN

## 2025-07-15 NOTE — TELEPHONE ENCOUNTER
RN attempted to reach patient. Left voicemail to call back and speak with a nurse. [facility] message sent.     When patient  calls back:  - previously prescribed by cardiology. Is patient able to reach out to them for refill? Is he requesting Dr Strange take over? Might need visit.       Kasia AGGARWAL RN on 7/15/2025 at 8:52 AM

## 2025-07-16 NOTE — TELEPHONE ENCOUNTER
Called and spoke to patient. Patient states he was able to get a refill of the metoprolol from Cardiology. Patient would like PCP to take over prescribing. Advised would need to be discussed in a visit and suggested e-visit. Patient agreeable to plan.  Mera KELLY RN, BSN  Clinic RN  [facility]

## 2025-07-27 ENCOUNTER — MYC REFILL (OUTPATIENT)
Dept: PEDIATRICS | Facility: CLINIC | Age: 60
End: 2025-07-27
Payer: MEDICARE

## 2025-07-27 DIAGNOSIS — G58.8 PUDENDAL NEURALGIA: ICD-10-CM

## 2025-07-28 RX ORDER — HYDROCODONE BITARTRATE AND ACETAMINOPHEN 5; 325 MG/1; MG/1
1-2 TABLET ORAL EVERY 6 HOURS PRN
Qty: 120 TABLET | Refills: 0 | Status: SHIPPED | OUTPATIENT
Start: 2025-07-28

## 2025-09-01 ENCOUNTER — MYC REFILL (OUTPATIENT)
Dept: PEDIATRICS | Facility: CLINIC | Age: 60
End: 2025-09-01
Payer: MEDICARE

## 2025-09-01 DIAGNOSIS — G58.8 PUDENDAL NEURALGIA: ICD-10-CM

## 2025-09-02 RX ORDER — HYDROCODONE BITARTRATE AND ACETAMINOPHEN 5; 325 MG/1; MG/1
1-2 TABLET ORAL EVERY 6 HOURS PRN
Qty: 120 TABLET | Refills: 0 | Status: SHIPPED | OUTPATIENT
Start: 2025-09-02